# Patient Record
Sex: FEMALE | Race: WHITE | NOT HISPANIC OR LATINO | Employment: UNEMPLOYED | URBAN - METROPOLITAN AREA
[De-identification: names, ages, dates, MRNs, and addresses within clinical notes are randomized per-mention and may not be internally consistent; named-entity substitution may affect disease eponyms.]

---

## 2018-02-26 ENCOUNTER — HOSPITAL ENCOUNTER (EMERGENCY)
Facility: HOSPITAL | Age: 28
Discharge: HOME | End: 2018-02-26
Attending: EMERGENCY MEDICINE
Payer: MEDICAID

## 2018-02-26 VITALS
RESPIRATION RATE: 16 BRPM | OXYGEN SATURATION: 100 % | HEART RATE: 89 BPM | WEIGHT: 140 LBS | SYSTOLIC BLOOD PRESSURE: 108 MMHG | BODY MASS INDEX: 21.29 KG/M2 | TEMPERATURE: 98 F | DIASTOLIC BLOOD PRESSURE: 58 MMHG

## 2018-02-26 DIAGNOSIS — N92.1 METRORRHAGIA: ICD-10-CM

## 2018-02-26 DIAGNOSIS — N39.0 URINARY TRACT INFECTION WITHOUT HEMATURIA, SITE UNSPECIFIED: Primary | ICD-10-CM

## 2018-02-26 LAB
B-HCG UR QL: NEGATIVE
BACTERIA #/AREA URNS HPF: ABNORMAL /HPF
BACTERIA GENITAL QL WET PREP: ABNORMAL
BILIRUB UR QL STRIP: NEGATIVE
C TRACH DNA SPEC QL NAA+PROBE: NOT DETECTED
CLARITY UR: ABNORMAL
CLUE CELLS VAG QL WET PREP: ABNORMAL
COLOR UR: YELLOW
CTP QC/QA: YES
FILAMENT FUNGI VAG WET PREP-#/AREA: ABNORMAL
GLUCOSE UR QL STRIP: NEGATIVE
HGB UR QL STRIP: ABNORMAL
KETONES UR QL STRIP: NEGATIVE
LEUKOCYTE ESTERASE UR QL STRIP: ABNORMAL
MICROSCOPIC COMMENT: ABNORMAL
N GONORRHOEA DNA SPEC QL NAA+PROBE: NOT DETECTED
NITRITE UR QL STRIP: NEGATIVE
PH UR STRIP: 6 [PH] (ref 5–8)
PROT UR QL STRIP: NEGATIVE
RBC #/AREA URNS HPF: 0 /HPF (ref 0–4)
SP GR UR STRIP: >=1.03 (ref 1–1.03)
SPECIMEN SOURCE: ABNORMAL
SQUAMOUS #/AREA URNS HPF: 7 /HPF
T VAGINALIS GENITAL QL WET PREP: ABNORMAL
URN SPEC COLLECT METH UR: ABNORMAL
UROBILINOGEN UR STRIP-ACNC: NEGATIVE EU/DL
WBC #/AREA URNS HPF: 20 /HPF (ref 0–5)
WBC #/AREA VAG WET PREP: ABNORMAL
YEAST GENITAL QL WET PREP: ABNORMAL

## 2018-02-26 PROCEDURE — 81025 URINE PREGNANCY TEST: CPT | Performed by: EMERGENCY MEDICINE

## 2018-02-26 PROCEDURE — 87210 SMEAR WET MOUNT SALINE/INK: CPT

## 2018-02-26 PROCEDURE — 87086 URINE CULTURE/COLONY COUNT: CPT

## 2018-02-26 PROCEDURE — 87491 CHLMYD TRACH DNA AMP PROBE: CPT

## 2018-02-26 PROCEDURE — 99284 EMERGENCY DEPT VISIT MOD MDM: CPT

## 2018-02-26 PROCEDURE — 81000 URINALYSIS NONAUTO W/SCOPE: CPT

## 2018-02-26 RX ORDER — NITROFURANTOIN 25; 75 MG/1; MG/1
100 CAPSULE ORAL 2 TIMES DAILY
Qty: 10 CAPSULE | Refills: 0 | Status: SHIPPED | OUTPATIENT
Start: 2018-02-26 | End: 2018-03-03

## 2018-02-26 NOTE — ED PROVIDER NOTES
Encounter Date: 2018       History     Chief Complaint   Patient presents with    Abdominal Pain     x 2 weeks     Donis Angel is a 27 year old female with no pmh presenting to the ED with a 2 week history of intermittent lower abdominal pain, nausea. Her LMP was 1-19-18 and she has had spotting since then. She states that she took a pregnancy test at home that was negative and wanted confirmation today. She has had urinary frequency but denies dysuria, hematuria, flank pain, fever. He also reports yellow vaginal discharge.           Review of patient's allergies indicates:   Allergen Reactions    Penicillins Anaphylaxis     History reviewed. No pertinent past medical history.  Past Surgical History:   Procedure Laterality Date    Caes       SECTION, CLASSIC      TONSILLECTOMY       History reviewed. No pertinent family history.  Social History   Substance Use Topics    Smoking status: Never Smoker    Smokeless tobacco: Not on file    Alcohol use No     Review of Systems   Constitutional: Negative for chills and fever.   HENT: Negative for congestion, rhinorrhea and sore throat.    Eyes: Negative for pain and redness.   Respiratory: Negative for cough and shortness of breath.    Cardiovascular: Negative for chest pain and palpitations.   Gastrointestinal: Positive for abdominal pain and nausea. Negative for diarrhea.   Genitourinary: Positive for frequency, vaginal bleeding and vaginal discharge. Negative for dysuria, flank pain, hematuria and urgency.   Musculoskeletal: Negative for gait problem, myalgias and neck pain.   Skin: Negative for rash.   Neurological: Negative for dizziness, light-headedness and headaches.       Physical Exam     Initial Vitals [18 0756]   BP Pulse Resp Temp SpO2   (!) 108/58 89 16 97.8 °F (36.6 °C) 100 %      MAP       74.67         Physical Exam    Constitutional: Vital signs are normal. She appears well-developed and well-nourished. She is not diaphoretic.  She is active. She does not have a sickly appearance. No distress.   HENT:   Head: Normocephalic and atraumatic.   Eyes: Conjunctivae are normal.   Neck: Normal range of motion and full passive range of motion without pain.   Cardiovascular: Normal rate, regular rhythm and normal heart sounds.   Pulmonary/Chest: Breath sounds normal.   Abdominal: Soft. Bowel sounds are normal. There is tenderness (mild suprapubic).   Genitourinary: Cervix exhibits no motion tenderness, no discharge and no friability. Right adnexum displays no tenderness. Left adnexum displays no tenderness. There is bleeding (small amount of dark red in vaginal vault) in the vagina. No vaginal discharge found.   Musculoskeletal: Normal range of motion.   Neurological: She is alert. Gait normal.   Skin: Skin is warm and dry. Capillary refill takes less than 2 seconds.   Psychiatric: She has a normal mood and affect.         ED Course   Procedures  Labs Reviewed   URINALYSIS   URINALYSIS MICROSCOPIC   POCT URINE PREGNANCY                   APC / Resident Notes:   Donis Angel is a 27 year old female presenting to the ED with suprapubic tenderness (mild), urinary frequency, and vaginal bleeding. Patient had no lower adnexal tenderness with only mild suprapubic tenderness. Minimal amount of vaginal bleeding noted on pelvic exam with no CMT. Patient has 20 WBC in the urine with no evidence of pyelonephritis. Patient will be discharged on macrobid for UTI and urine will be sent for culture. I do not suspect PID, ovarian torsion, TOA as she has no adnexal tenderness. I recommended close follow up with her GYN, Dr. Griffiths. I discussed specific return precautions and she verbalized understanding. Based on my clinical evaluation, I do not appreciate any immediate, emergent, or life threatening condition or etiology that warrants additional workup today and feel that the patient can be discharged with close follow up care.            Attending Attestation:              Attending ED Notes:    I discussed the patient's care with Advanced Practice Clinician.  I reviewed their note and agree with the history, physical, assessment, diagnosis, treatment, and discharge plan provided by the Advanced Practice Clinician.  We'll treat patient for urinary tract infection with no signs of pyelonephritis.  No fevers.  Follow up with GYN.             Clinical Impression:   The primary encounter diagnosis was Urinary tract infection without hematuria, site unspecified. A diagnosis of Metrorrhagia was also pertinent to this visit.    Disposition:   Disposition: Discharged  Condition: Stable                        Shirlene Vera NP  02/26/18 6523

## 2018-02-27 LAB — BACTERIA UR CULT: NORMAL

## 2018-10-18 ENCOUNTER — HOSPITAL ENCOUNTER (EMERGENCY)
Facility: HOSPITAL | Age: 28
Discharge: LEFT BEFORE DISCHARGE | End: 2018-10-18
Attending: EMERGENCY MEDICINE
Payer: MEDICAID

## 2018-10-18 VITALS
SYSTOLIC BLOOD PRESSURE: 125 MMHG | TEMPERATURE: 98 F | HEIGHT: 68 IN | HEART RATE: 95 BPM | DIASTOLIC BLOOD PRESSURE: 70 MMHG | RESPIRATION RATE: 14 BRPM | OXYGEN SATURATION: 100 % | BODY MASS INDEX: 23.95 KG/M2 | WEIGHT: 158 LBS

## 2018-10-18 DIAGNOSIS — N39.0 LOWER URINARY TRACT INFECTIOUS DISEASE: ICD-10-CM

## 2018-10-18 DIAGNOSIS — Z3A.01 LESS THAN 8 WEEKS GESTATION OF PREGNANCY: Primary | ICD-10-CM

## 2018-10-18 LAB
ALBUMIN SERPL BCP-MCNC: 4.2 G/DL
ALP SERPL-CCNC: 63 U/L
ALT SERPL W/O P-5'-P-CCNC: 8 U/L
ANION GAP SERPL CALC-SCNC: 6 MMOL/L
AST SERPL-CCNC: 14 U/L
B-HCG UR QL: POSITIVE
BACTERIA #/AREA URNS HPF: ABNORMAL /HPF
BASOPHILS # BLD AUTO: 0 K/UL
BASOPHILS NFR BLD: 0.7 %
BILIRUB SERPL-MCNC: 0.4 MG/DL
BILIRUB UR QL STRIP: NEGATIVE
BUN SERPL-MCNC: 10 MG/DL
CALCIUM SERPL-MCNC: 10 MG/DL
CHLORIDE SERPL-SCNC: 105 MMOL/L
CLARITY UR: CLEAR
CO2 SERPL-SCNC: 26 MMOL/L
COLOR UR: YELLOW
CREAT SERPL-MCNC: 0.7 MG/DL
CTP QC/QA: YES
DIFFERENTIAL METHOD: ABNORMAL
EOSINOPHIL # BLD AUTO: 0 K/UL
EOSINOPHIL NFR BLD: 0.5 %
ERYTHROCYTE [DISTWIDTH] IN BLOOD BY AUTOMATED COUNT: 15.2 %
EST. GFR  (AFRICAN AMERICAN): >60 ML/MIN/1.73 M^2
EST. GFR  (NON AFRICAN AMERICAN): >60 ML/MIN/1.73 M^2
GLUCOSE SERPL-MCNC: 89 MG/DL
GLUCOSE UR QL STRIP: NEGATIVE
HCT VFR BLD AUTO: 35.1 %
HGB BLD-MCNC: 11.6 G/DL
HGB UR QL STRIP: ABNORMAL
KETONES UR QL STRIP: ABNORMAL
LEUKOCYTE ESTERASE UR QL STRIP: ABNORMAL
LIPASE SERPL-CCNC: 27 U/L
LYMPHOCYTES # BLD AUTO: 1.5 K/UL
LYMPHOCYTES NFR BLD: 23.1 %
MCH RBC QN AUTO: 26.5 PG
MCHC RBC AUTO-ENTMCNC: 32.9 G/DL
MCV RBC AUTO: 80 FL
MICROSCOPIC COMMENT: ABNORMAL
MONOCYTES # BLD AUTO: 0.5 K/UL
MONOCYTES NFR BLD: 7.9 %
NEUTROPHILS # BLD AUTO: 4.3 K/UL
NEUTROPHILS NFR BLD: 67.8 %
NITRITE UR QL STRIP: NEGATIVE
PH UR STRIP: 6 [PH] (ref 5–8)
PLATELET # BLD AUTO: 232 K/UL
PMV BLD AUTO: 8.2 FL
POTASSIUM SERPL-SCNC: 4.1 MMOL/L
PROT SERPL-MCNC: 7.6 G/DL
PROT UR QL STRIP: NEGATIVE
RBC # BLD AUTO: 4.37 M/UL
RBC #/AREA URNS HPF: 6 /HPF (ref 0–4)
SODIUM SERPL-SCNC: 137 MMOL/L
SP GR UR STRIP: >=1.03 (ref 1–1.03)
SQUAMOUS #/AREA URNS HPF: 16 /HPF
URN SPEC COLLECT METH UR: ABNORMAL
UROBILINOGEN UR STRIP-ACNC: NEGATIVE EU/DL
WBC # BLD AUTO: 6.4 K/UL
WBC #/AREA URNS HPF: 14 /HPF (ref 0–5)

## 2018-10-18 PROCEDURE — 81025 URINE PREGNANCY TEST: CPT | Performed by: EMERGENCY MEDICINE

## 2018-10-18 PROCEDURE — 83690 ASSAY OF LIPASE: CPT

## 2018-10-18 PROCEDURE — 36415 COLL VENOUS BLD VENIPUNCTURE: CPT

## 2018-10-18 PROCEDURE — 81000 URINALYSIS NONAUTO W/SCOPE: CPT

## 2018-10-18 PROCEDURE — 80053 COMPREHEN METABOLIC PANEL: CPT

## 2018-10-18 PROCEDURE — 99284 EMERGENCY DEPT VISIT MOD MDM: CPT

## 2018-10-18 PROCEDURE — 85025 COMPLETE CBC W/AUTO DIFF WBC: CPT

## 2018-10-18 RX ORDER — NITROFURANTOIN 25; 75 MG/1; MG/1
100 CAPSULE ORAL 2 TIMES DAILY
Qty: 14 CAPSULE | Refills: 0 | Status: SHIPPED | OUTPATIENT
Start: 2018-10-18 | End: 2023-11-15

## 2018-10-18 NOTE — ED NOTES
Pt in room 7 for evaluation of abd pain.  Pt is awake, alert and oriented. Resp even and unlabored.  Pt denies chest pain or sob. Pt denies nausea, vomiting or diarrhea.  Pt reports few days late on period and possibly pregnant.  Skin warm and dry. Pt denies vaginal bleeding or discharge.

## 2018-10-18 NOTE — ED PROVIDER NOTES
"Encounter Date: 10/18/2018    SCRIBE #1 NOTE: I, Vanita Luke, am scribing for, and in the presence of, Dr. Centeno.       History     Chief Complaint   Patient presents with    Abdominal Pain     lower abd cramping that started last week and has gotten worse. Denies any n/v/d at this time.       Time seen by provider: 1:31 PM on 10/18/2018    Donis Angel is a 28 y.o. female who presents to the ED complaining of lower abdominal pain x 2 weeks. She describes the pain as "all day, every day." She states that she is 2 days late on her period and does not take birth control. The patient denies nausea, vomiting, or diarrhea, or any other symptoms at this time. No pertinent PMHx or SHx noted. Allergies include Pcn.      The history is provided by the patient.     Review of patient's allergies indicates:   Allergen Reactions    Penicillins Anaphylaxis     No past medical history on file.  Past Surgical History:   Procedure Laterality Date    Caes       SECTION, CLASSIC      TONSILLECTOMY       No family history on file.  Social History     Tobacco Use    Smoking status: Never Smoker   Substance Use Topics    Alcohol use: No    Drug use: No     Review of Systems   Constitutional: Negative for fever.   HENT: Negative for sore throat.    Respiratory: Negative for shortness of breath.    Cardiovascular: Negative for chest pain.   Gastrointestinal: Positive for abdominal pain. Negative for diarrhea, nausea and vomiting.   Genitourinary: Negative for dysuria.   Musculoskeletal: Negative for back pain.   Skin: Negative for rash.   Neurological: Negative for weakness.   Hematological: Does not bruise/bleed easily.       Physical Exam     Initial Vitals [10/18/18 1309]   BP Pulse Resp Temp SpO2   125/70 95 14 97.9 °F (36.6 °C) 100 %      MAP       --         Physical Exam    Nursing note and vitals reviewed.  Constitutional: She appears well-developed and well-nourished.  Non-toxic appearance. No distress. "   HENT:   Head: Normocephalic and atraumatic.   Eyes: EOM are normal. Pupils are equal, round, and reactive to light.   Neck: Normal range of motion. Neck supple. No neck rigidity. No JVD present.   Cardiovascular: Normal rate, regular rhythm, normal heart sounds and intact distal pulses. Exam reveals no gallop and no friction rub.    No murmur heard.  Pulmonary/Chest: Breath sounds normal. She has no wheezes. She has no rhonchi. She has no rales.   Abdominal: Soft. Bowel sounds are normal. She exhibits no distension. There is no tenderness. There is no rigidity, no rebound and no guarding.   Musculoskeletal: Normal range of motion.   Neurological: She is alert and oriented to person, place, and time. She has normal strength and normal reflexes. No cranial nerve deficit or sensory deficit. She exhibits normal muscle tone. Coordination normal. GCS eye subscore is 4. GCS verbal subscore is 5. GCS motor subscore is 6.   Skin: Skin is warm and dry.   Psychiatric: She has a normal mood and affect. Her speech is normal and behavior is normal. She is not actively hallucinating.         ED Course   Procedures  Labs Reviewed   URINALYSIS - Abnormal; Notable for the following components:       Result Value    Specific Gravity, UA >=1.030 (*)     Ketones, UA Trace (*)     Occult Blood UA 1+ (*)     Leukocytes, UA 2+ (*)     All other components within normal limits   CBC W/ AUTO DIFFERENTIAL - Abnormal; Notable for the following components:    Hemoglobin 11.6 (*)     Hematocrit 35.1 (*)     MCV 80 (*)     MCH 26.5 (*)     RDW 15.2 (*)     MPV 8.2 (*)     All other components within normal limits   COMPREHENSIVE METABOLIC PANEL - Abnormal; Notable for the following components:    ALT 8 (*)     Anion Gap 6 (*)     All other components within normal limits   URINALYSIS MICROSCOPIC - Abnormal; Notable for the following components:    RBC, UA 6 (*)     WBC, UA 14 (*)     Bacteria, UA Moderate (*)     All other components within  normal limits   POCT URINE PREGNANCY - Abnormal; Notable for the following components:    POC Preg Test, Ur Positive (*)     All other components within normal limits   LIPASE          Imaging Results    None          Medical Decision Making:   History:   Old Medical Records: I decided to obtain old medical records.  Initial Assessment:   Patient is a 28-year-old woman who presents emergency department for evaluation of abdominal pain.  She has a negative and completely benign abdominal examination on exam and.  Patient states she had questionable positive pregnancy test at home and would like a pregnancy test.  Pregnancy test was performed and was positive. Patient was informed of this.  Patient eloped from the emergency department while awaiting laboratory evaluation.  Urinalysis returned concerning for possible urinary tractInfection.  I have low suspicion for ruptured ectopic pregnancy, pyelonephritis or sepsis.  Called patient and left a voice message on machine informing of UTI and need to  a prescription that I wrote for her for Macrobid.  Clinical Tests:   Lab Tests: Ordered and Reviewed            Scribe Attestation:   Scribe #1: I performed the above scribed service and the documentation accurately describes the services I performed. I attest to the accuracy of the note.        I, Jacobo Hollingsworth, personally performed the services described in this documentation. All medical record entries made by the scribe were at my direction and in my presence.  I have reviewed the chart and agree that the record reflects my personal performance and is accurate and complete. Nish Centeno MD.  9:15 PM 10/18/2018          Clinical Impression:   The primary encounter diagnosis was Less than 8 weeks gestation of pregnancy. A diagnosis of Lower urinary tract infectious disease was also pertinent to this visit.      Disposition:   Disposition: Discharged  Condition: Stable                        Nish DING  MD Jacobo  10/18/18 [id number]

## 2020-10-13 NOTE — PROGRESS NOTES
"1520 Spoke with Dr. Griffiths re. Pt states she had an anaphylactic rxn to pcn. Dr. Griffiths states this is not what the pt stated to her. Dr. Griffiths states to hold the Rocephin injection until she decides how to treat and she will speak with pt tomorrow concerning tx.  1527 Spoke with pt and tried to get more clarification from pt re. Rxn to pcn. Pt states had increased heart rate, nausea and vomiting and "felt like I couldn't breath". Pt states she can't remember what she was given for the allergic rxn, if anything. Could not get real clarification from pt. Instructed her to call Dr. Griffiths office tomorrow for further instructions.    "

## 2020-10-15 ENCOUNTER — INFUSION (OUTPATIENT)
Dept: INFUSION THERAPY | Facility: HOSPITAL | Age: 30
End: 2020-10-15
Attending: SPECIALIST
Payer: MEDICAID

## 2020-10-15 VITALS
HEART RATE: 88 BPM | DIASTOLIC BLOOD PRESSURE: 84 MMHG | TEMPERATURE: 98 F | RESPIRATION RATE: 20 BRPM | SYSTOLIC BLOOD PRESSURE: 119 MMHG

## 2020-10-15 DIAGNOSIS — A54.9 GONORRHEA: Primary | ICD-10-CM

## 2020-10-15 PROCEDURE — 96372 THER/PROPH/DIAG INJ SC/IM: CPT

## 2020-10-15 PROCEDURE — 63600175 PHARM REV CODE 636 W HCPCS: Performed by: SPECIALIST

## 2020-10-15 RX ORDER — GENTAMICIN SULFATE 40 MG/ML
240 INJECTION, SOLUTION INTRAMUSCULAR; INTRAVENOUS ONCE
Status: COMPLETED | OUTPATIENT
Start: 2020-10-15 | End: 2020-10-15

## 2020-10-15 RX ADMIN — GENTAMICIN SULFATE 240 MG: 40 INJECTION, SOLUTION INTRAMUSCULAR; INTRAVENOUS at 12:10

## 2020-10-15 NOTE — PROGRESS NOTES
1245 shot time completed, pt tolerated well, denies any symptoms of reaction, discharged to home

## 2021-07-07 ENCOUNTER — HOSPITAL ENCOUNTER (EMERGENCY)
Facility: HOSPITAL | Age: 31
Discharge: HOME | End: 2021-07-07
Attending: EMERGENCY MEDICINE
Payer: MEDICAID

## 2021-07-07 VITALS
OXYGEN SATURATION: 96 % | BODY MASS INDEX: 28.04 KG/M2 | HEIGHT: 68 IN | DIASTOLIC BLOOD PRESSURE: 90 MMHG | WEIGHT: 185 LBS | TEMPERATURE: 99 F | RESPIRATION RATE: 18 BRPM | SYSTOLIC BLOOD PRESSURE: 138 MMHG | HEART RATE: 95 BPM

## 2021-07-07 DIAGNOSIS — K08.89 TOOTHACHE: Primary | ICD-10-CM

## 2021-07-07 PROCEDURE — 99282 EMERGENCY DEPT VISIT SF MDM: CPT

## 2021-07-07 RX ORDER — DOXYCYCLINE 100 MG/1
100 CAPSULE ORAL 2 TIMES DAILY
Qty: 20 CAPSULE | Refills: 0 | Status: SHIPPED | OUTPATIENT
Start: 2021-07-07 | End: 2021-07-17

## 2021-07-07 RX ORDER — HYDROCODONE BITARTRATE AND ACETAMINOPHEN 5; 325 MG/1; MG/1
1 TABLET ORAL EVERY 4 HOURS PRN
Qty: 12 TABLET | Refills: 0 | Status: SHIPPED | OUTPATIENT
Start: 2021-07-07

## 2022-01-28 DIAGNOSIS — N64.4 MASTODYNIA: Primary | ICD-10-CM

## 2022-05-06 ENCOUNTER — HOSPITAL ENCOUNTER (EMERGENCY)
Facility: HOSPITAL | Age: 32
Discharge: HOME | End: 2022-05-07
Attending: EMERGENCY MEDICINE
Payer: MEDICAID

## 2022-05-06 DIAGNOSIS — R51.9 NONINTRACTABLE HEADACHE, UNSPECIFIED CHRONICITY PATTERN, UNSPECIFIED HEADACHE TYPE: ICD-10-CM
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
## 2022-05-06 DIAGNOSIS — M54.2 NECK PAIN: Primary | ICD-10-CM

## 2022-05-06 PROCEDURE — 99285 EMERGENCY DEPT VISIT HI MDM: CPT

## 2022-05-06 NOTE — Clinical Note
"Donis Xiong" Stanley was seen and treated in our emergency department on 5/6/2022.  She may return to work on 05/10/2022.       If you have any questions or concerns, please don't hesitate to call.      Dinesh Fitzpatrick RN    "

## 2022-05-06 NOTE — Clinical Note
"Donis"Montserrat Angel was seen and treated in our emergency department on 5/6/2022.  She may return to work on 05/09/2022.       If you have any questions or concerns, please don't hesitate to call.      Eder Altamirano MD"

## 2022-05-07 VITALS
DIASTOLIC BLOOD PRESSURE: 63 MMHG | HEART RATE: 85 BPM | SYSTOLIC BLOOD PRESSURE: 104 MMHG | RESPIRATION RATE: 17 BRPM | BODY MASS INDEX: 25.09 KG/M2 | OXYGEN SATURATION: 97 % | TEMPERATURE: 99 F | WEIGHT: 165 LBS

## 2022-05-07 LAB
B-HCG UR QL: NEGATIVE
CTP QC/QA: YES

## 2022-05-07 PROCEDURE — 81025 URINE PREGNANCY TEST: CPT | Performed by: STUDENT IN AN ORGANIZED HEALTH CARE EDUCATION/TRAINING PROGRAM

## 2022-05-07 PROCEDURE — 25000003 PHARM REV CODE 250: Performed by: STUDENT IN AN ORGANIZED HEALTH CARE EDUCATION/TRAINING PROGRAM

## 2022-05-07 RX ORDER — NAPROXEN 500 MG/1
500 TABLET ORAL 2 TIMES DAILY WITH MEALS
Qty: 10 TABLET | Refills: 0 | Status: SHIPPED | OUTPATIENT
Start: 2022-05-07 | End: 2022-05-12

## 2022-05-07 RX ORDER — METHOCARBAMOL 500 MG/1
500 TABLET, FILM COATED ORAL 3 TIMES DAILY
Qty: 15 TABLET | Refills: 0 | Status: SHIPPED | OUTPATIENT
Start: 2022-05-07 | End: 2022-05-12

## 2022-05-07 RX ORDER — ACETAMINOPHEN 500 MG
1000 TABLET ORAL
Status: COMPLETED | OUTPATIENT
Start: 2022-05-07 | End: 2022-05-07

## 2022-05-07 RX ADMIN — ACETAMINOPHEN 1000 MG: 500 TABLET, FILM COATED ORAL at 02:05
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
## 2022-05-07 NOTE — DISCHARGE INSTRUCTIONS
Take medications as prescribed will give Robaxin and naproxen for pain.  Return if worsening symptoms.

## 2022-05-07 NOTE — ED NOTES
Adult Physical Assessment  LOC: Donis Angel, 31 y.o. female verified via two identifiers.  The patient is awake, alert, oriented and speaking appropriately at this time.  APPEARANCE: Patient resting comfortably and appears to be in no acute distress at this time. Patient is clean and well groomed, patient's clothing is properly fastened.  SKIN:The skin is warm and dry, color consistent with ethnicity, patient has normal skin turgor and moist mucus membranes, skin intact, no breakdown or brusing noted.  MUSCULOSKELETAL: Patient moving all extremities well, no obvious swelling or deformities noted.  Pt complains of neck pain, right lower side pain, and top and front of head hurting. 10/10 pain    RESPIRATORY: Airway is open and patent, respirations are spontaneous, patient has a normal effort and rate, no accessory muscle use noted.  CARDIAC: Patient has a normal rate and rhythm, no periphreal edema noted in any extremity, capillary refill < 3 seconds in all extremities  ABDOMEN: Soft and non tender to palpation, no abdominal distention noted. Bowel sounds present in all four quadrants.  NEUROLOGIC: Eyes open spontaneously, behavior appropriate to situation, follows commands, facial expression symmetrical, bilateral hand grasp equal and even, purposeful motor response noted, normal sensation in all extremities when touched with a finger.

## 2022-05-07 NOTE — ED NOTES
Resting comfortably on stretcher with rr e/u. Vss. Pillow provided for additional comfort. No other complaints/requests made. Pt in nad with significant other at bedside. Awaiting ct results.

## 2022-11-30 ENCOUNTER — HOSPITAL ENCOUNTER (OUTPATIENT)
Facility: HOSPITAL | Age: 32
Discharge: HOME | End: 2022-12-01
Attending: EMERGENCY MEDICINE | Admitting: OBSTETRICS & GYNECOLOGY
Payer: MEDICAID

## 2022-11-30 DIAGNOSIS — R10.2 PELVIC PAIN: Primary | ICD-10-CM

## 2022-11-30 DIAGNOSIS — R18.8 FREE FLUID IN PELVIS: ICD-10-CM

## 2022-11-30 PROBLEM — N83.209 RUPTURED CYST OF OVARY: Status: ACTIVE | Noted: 2022-11-30

## 2022-11-30 LAB
ALBUMIN SERPL BCP-MCNC: 4.1 G/DL (ref 3.5–5.2)
ALP SERPL-CCNC: 57 U/L (ref 55–135)
ALT SERPL W/O P-5'-P-CCNC: 14 U/L (ref 10–44)
ANION GAP SERPL CALC-SCNC: 6 MMOL/L (ref 8–16)
AST SERPL-CCNC: 17 U/L (ref 10–40)
B-HCG UR QL: NEGATIVE
B-HCG UR QL: NEGATIVE
BACTERIA GENITAL QL WET PREP: ABNORMAL
BASOPHILS # BLD AUTO: 0.04 K/UL (ref 0–0.2)
BASOPHILS NFR BLD: 0.4 % (ref 0–1.9)
BILIRUB SERPL-MCNC: 1 MG/DL (ref 0.1–1)
BILIRUB UR QL STRIP: NEGATIVE
BUN SERPL-MCNC: 13 MG/DL (ref 6–20)
CALCIUM SERPL-MCNC: 9 MG/DL (ref 8.7–10.5)
CHLORIDE SERPL-SCNC: 104 MMOL/L (ref 95–110)
CK SERPL-CCNC: 54 U/L (ref 20–180)
CLARITY UR: CLEAR
CLUE CELLS VAG QL WET PREP: ABNORMAL
CO2 SERPL-SCNC: 24 MMOL/L (ref 23–29)
COLOR UR: YELLOW
CREAT SERPL-MCNC: 0.7 MG/DL (ref 0.5–1.4)
CTP QC/QA: YES
D DIMER PPP IA.FEU-MCNC: 1.31 UG/ML FEU
DIFFERENTIAL METHOD: ABNORMAL
EOSINOPHIL # BLD AUTO: 0 K/UL (ref 0–0.5)
EOSINOPHIL NFR BLD: 0.1 % (ref 0–8)
EOSINOPHIL NFR BLD: 0.3 % (ref 0–8)
EOSINOPHIL NFR BLD: 0.3 % (ref 0–8)
ERYTHROCYTE [DISTWIDTH] IN BLOOD BY AUTOMATED COUNT: 12.4 % (ref 11.5–14.5)
ERYTHROCYTE [DISTWIDTH] IN BLOOD BY AUTOMATED COUNT: 12.5 % (ref 11.5–14.5)
ERYTHROCYTE [DISTWIDTH] IN BLOOD BY AUTOMATED COUNT: 12.5 % (ref 11.5–14.5)
EST. GFR  (NO RACE VARIABLE): >60 ML/MIN/1.73 M^2
FILAMENT FUNGI VAG WET PREP-#/AREA: ABNORMAL
GLUCOSE SERPL-MCNC: 93 MG/DL (ref 70–110)
GLUCOSE UR QL STRIP: NEGATIVE
GROUP A STREP, MOLECULAR: NEGATIVE
HCT VFR BLD AUTO: 28.9 % (ref 37–48.5)
HCT VFR BLD AUTO: 31.8 % (ref 37–48.5)
HCT VFR BLD AUTO: 35.4 % (ref 37–48.5)
HGB BLD-MCNC: 10 G/DL (ref 12–16)
HGB BLD-MCNC: 10.9 G/DL (ref 12–16)
HGB BLD-MCNC: 12.3 G/DL (ref 12–16)
HGB UR QL STRIP: ABNORMAL
IMM GRANULOCYTES # BLD AUTO: 0.03 K/UL (ref 0–0.04)
IMM GRANULOCYTES # BLD AUTO: 0.04 K/UL (ref 0–0.04)
IMM GRANULOCYTES # BLD AUTO: 0.06 K/UL (ref 0–0.04)
IMM GRANULOCYTES NFR BLD AUTO: 0.3 % (ref 0–0.5)
IMM GRANULOCYTES NFR BLD AUTO: 0.4 % (ref 0–0.5)
IMM GRANULOCYTES NFR BLD AUTO: 0.5 % (ref 0–0.5)
INFLUENZA A, MOLECULAR: NEGATIVE
INFLUENZA B, MOLECULAR: NEGATIVE
KETONES UR QL STRIP: NEGATIVE
LEUKOCYTE ESTERASE UR QL STRIP: NEGATIVE
LIPASE SERPL-CCNC: 28 U/L (ref 4–60)
LYMPHOCYTES # BLD AUTO: 1.7 K/UL (ref 1–4.8)
LYMPHOCYTES # BLD AUTO: 1.8 K/UL (ref 1–4.8)
LYMPHOCYTES # BLD AUTO: 2.4 K/UL (ref 1–4.8)
LYMPHOCYTES NFR BLD: 15.3 % (ref 18–48)
LYMPHOCYTES NFR BLD: 17.5 % (ref 18–48)
LYMPHOCYTES NFR BLD: 21.9 % (ref 18–48)
MAGNESIUM SERPL-MCNC: 2.1 MG/DL (ref 1.6–2.6)
MCH RBC QN AUTO: 30.8 PG (ref 27–31)
MCH RBC QN AUTO: 31.1 PG (ref 27–31)
MCH RBC QN AUTO: 31.4 PG (ref 27–31)
MCHC RBC AUTO-ENTMCNC: 34.3 G/DL (ref 32–36)
MCHC RBC AUTO-ENTMCNC: 34.6 G/DL (ref 32–36)
MCHC RBC AUTO-ENTMCNC: 34.7 G/DL (ref 32–36)
MCV RBC AUTO: 89 FL (ref 82–98)
MCV RBC AUTO: 90 FL (ref 82–98)
MCV RBC AUTO: 91 FL (ref 82–98)
MONOCYTES # BLD AUTO: 0.7 K/UL (ref 0.3–1)
MONOCYTES # BLD AUTO: 0.7 K/UL (ref 0.3–1)
MONOCYTES # BLD AUTO: 0.8 K/UL (ref 0.3–1)
MONOCYTES NFR BLD: 6.1 % (ref 4–15)
MONOCYTES NFR BLD: 6.6 % (ref 4–15)
MONOCYTES NFR BLD: 7.6 % (ref 4–15)
NEUTROPHILS # BLD AUTO: 7.5 K/UL (ref 1.8–7.7)
NEUTROPHILS # BLD AUTO: 7.7 K/UL (ref 1.8–7.7)
NEUTROPHILS # BLD AUTO: 8.5 K/UL (ref 1.8–7.7)
NEUTROPHILS NFR BLD: 70.4 % (ref 38–73)
NEUTROPHILS NFR BLD: 73.9 % (ref 38–73)
NEUTROPHILS NFR BLD: 77.6 % (ref 38–73)
NITRITE UR QL STRIP: NEGATIVE
NRBC BLD-RTO: 0 /100 WBC
PH UR STRIP: 7 [PH] (ref 5–8)
PLATELET # BLD AUTO: 160 K/UL (ref 150–450)
PLATELET # BLD AUTO: 193 K/UL (ref 150–450)
PLATELET # BLD AUTO: 199 K/UL (ref 150–450)
PMV BLD AUTO: 10.8 FL (ref 9.2–12.9)
PMV BLD AUTO: 10.9 FL (ref 9.2–12.9)
PMV BLD AUTO: 11.1 FL (ref 9.2–12.9)
POTASSIUM SERPL-SCNC: 3.8 MMOL/L (ref 3.5–5.1)
PROT SERPL-MCNC: 7.3 G/DL (ref 6–8.4)
PROT UR QL STRIP: NEGATIVE
RBC # BLD AUTO: 3.18 M/UL (ref 4–5.4)
RBC # BLD AUTO: 3.54 M/UL (ref 4–5.4)
RBC # BLD AUTO: 3.96 M/UL (ref 4–5.4)
SARS-COV-2 RDRP RESP QL NAA+PROBE: NEGATIVE
SODIUM SERPL-SCNC: 134 MMOL/L (ref 136–145)
SP GR UR STRIP: <=1.005 (ref 1–1.03)
SPECIMEN SOURCE: ABNORMAL
SPECIMEN SOURCE: NORMAL
T VAGINALIS GENITAL QL WET PREP: ABNORMAL
URN SPEC COLLECT METH UR: ABNORMAL
UROBILINOGEN UR STRIP-ACNC: NEGATIVE EU/DL
WBC # BLD AUTO: 10.19 K/UL (ref 3.9–12.7)
WBC # BLD AUTO: 10.9 K/UL (ref 3.9–12.7)
WBC # BLD AUTO: 10.96 K/UL (ref 3.9–12.7)
WBC #/AREA VAG WET PREP: ABNORMAL
YEAST GENITAL QL WET PREP: ABNORMAL

## 2022-11-30 PROCEDURE — 85379 FIBRIN DEGRADATION QUANT: CPT | Performed by: EMERGENCY MEDICINE

## 2022-11-30 PROCEDURE — G0378 HOSPITAL OBSERVATION PER HR: HCPCS

## 2022-11-30 PROCEDURE — 85025 COMPLETE CBC W/AUTO DIFF WBC: CPT | Performed by: EMERGENCY MEDICINE

## 2022-11-30 PROCEDURE — U0002 COVID-19 LAB TEST NON-CDC: HCPCS | Performed by: EMERGENCY MEDICINE

## 2022-11-30 PROCEDURE — 36415 COLL VENOUS BLD VENIPUNCTURE: CPT | Performed by: EMERGENCY MEDICINE

## 2022-11-30 PROCEDURE — 81025 URINE PREGNANCY TEST: CPT | Mod: 91 | Performed by: EMERGENCY MEDICINE

## 2022-11-30 PROCEDURE — 96361 HYDRATE IV INFUSION ADD-ON: CPT

## 2022-11-30 PROCEDURE — 85025 COMPLETE CBC W/AUTO DIFF WBC: CPT | Mod: 91 | Performed by: EMERGENCY MEDICINE

## 2022-11-30 PROCEDURE — 87491 CHLMYD TRACH DNA AMP PROBE: CPT | Performed by: EMERGENCY MEDICINE

## 2022-11-30 PROCEDURE — 83735 ASSAY OF MAGNESIUM: CPT | Performed by: EMERGENCY MEDICINE

## 2022-11-30 PROCEDURE — 25500020 PHARM REV CODE 255: Performed by: EMERGENCY MEDICINE

## 2022-11-30 PROCEDURE — 99285 EMERGENCY DEPT VISIT HI MDM: CPT | Mod: 25

## 2022-11-30 PROCEDURE — 87081 CULTURE SCREEN ONLY: CPT | Performed by: EMERGENCY MEDICINE

## 2022-11-30 PROCEDURE — 87591 N.GONORRHOEAE DNA AMP PROB: CPT | Performed by: EMERGENCY MEDICINE

## 2022-11-30 PROCEDURE — 83690 ASSAY OF LIPASE: CPT | Performed by: EMERGENCY MEDICINE

## 2022-11-30 PROCEDURE — 80053 COMPREHEN METABOLIC PANEL: CPT | Performed by: EMERGENCY MEDICINE

## 2022-11-30 PROCEDURE — 96375 TX/PRO/DX INJ NEW DRUG ADDON: CPT

## 2022-11-30 PROCEDURE — 87651 STREP A DNA AMP PROBE: CPT | Performed by: EMERGENCY MEDICINE

## 2022-11-30 PROCEDURE — 86850 RBC ANTIBODY SCREEN: CPT | Performed by: EMERGENCY MEDICINE

## 2022-11-30 PROCEDURE — 82550 ASSAY OF CK (CPK): CPT | Performed by: EMERGENCY MEDICINE

## 2022-11-30 PROCEDURE — 81003 URINALYSIS AUTO W/O SCOPE: CPT | Performed by: EMERGENCY MEDICINE

## 2022-11-30 PROCEDURE — 81025 URINE PREGNANCY TEST: CPT | Performed by: EMERGENCY MEDICINE

## 2022-11-30 PROCEDURE — 96365 THER/PROPH/DIAG IV INF INIT: CPT | Mod: 59

## 2022-11-30 PROCEDURE — 87502 INFLUENZA DNA AMP PROBE: CPT | Performed by: EMERGENCY MEDICINE

## 2022-11-30 PROCEDURE — 63600175 PHARM REV CODE 636 W HCPCS: Performed by: EMERGENCY MEDICINE

## 2022-11-30 PROCEDURE — 25000003 PHARM REV CODE 250: Performed by: EMERGENCY MEDICINE

## 2022-11-30 PROCEDURE — 87210 SMEAR WET MOUNT SALINE/INK: CPT | Performed by: EMERGENCY MEDICINE

## 2022-11-30 PROCEDURE — 87205 SMEAR GRAM STAIN: CPT | Performed by: EMERGENCY MEDICINE

## 2022-11-30 RX ORDER — ACETAMINOPHEN 10 MG/ML
1000 INJECTION, SOLUTION INTRAVENOUS ONCE
Status: COMPLETED | OUTPATIENT
Start: 2022-11-30 | End: 2022-11-30

## 2022-11-30 RX ORDER — SODIUM CHLORIDE, SODIUM LACTATE, POTASSIUM CHLORIDE, CALCIUM CHLORIDE 600; 310; 30; 20 MG/100ML; MG/100ML; MG/100ML; MG/100ML
INJECTION, SOLUTION INTRAVENOUS CONTINUOUS
Status: DISCONTINUED | OUTPATIENT
Start: 2022-12-01 | End: 2022-12-01 | Stop reason: HOSPADM

## 2022-11-30 RX ORDER — ONDANSETRON 2 MG/ML
4 INJECTION INTRAMUSCULAR; INTRAVENOUS
Status: COMPLETED | OUTPATIENT
Start: 2022-11-30 | End: 2022-11-30

## 2022-11-30 RX ORDER — MORPHINE SULFATE 2 MG/ML
2 INJECTION, SOLUTION INTRAMUSCULAR; INTRAVENOUS
Status: DISCONTINUED | OUTPATIENT
Start: 2022-11-30 | End: 2022-11-30

## 2022-11-30 RX ORDER — ACETAMINOPHEN 650 MG/1
650 SUPPOSITORY RECTAL EVERY 8 HOURS PRN
Status: DISCONTINUED | OUTPATIENT
Start: 2022-11-30 | End: 2022-12-01

## 2022-11-30 RX ORDER — ONDANSETRON 2 MG/ML
4 INJECTION INTRAMUSCULAR; INTRAVENOUS EVERY 8 HOURS PRN
Status: DISCONTINUED | OUTPATIENT
Start: 2022-11-30 | End: 2022-12-01 | Stop reason: HOSPADM

## 2022-11-30 RX ADMIN — SODIUM CHLORIDE 1000 ML: 9 INJECTION, SOLUTION INTRAVENOUS at 04:11

## 2022-11-30 RX ADMIN — ACETAMINOPHEN 1000 MG: 10 INJECTION, SOLUTION INTRAVENOUS at 09:11

## 2022-11-30 RX ADMIN — SODIUM CHLORIDE 1000 ML: 0.9 INJECTION, SOLUTION INTRAVENOUS at 09:11

## 2022-11-30 RX ADMIN — ONDANSETRON 4 MG: 2 INJECTION INTRAMUSCULAR; INTRAVENOUS at 03:11

## 2022-11-30 RX ADMIN — IOHEXOL 100 ML: 350 INJECTION, SOLUTION INTRAVENOUS at 05:11

## 2022-11-30 NOTE — FIRST PROVIDER EVALUATION
"Medical screening examination initiated.  I have conducted a focused provider triage encounter, findings are as follows:    Brief history of present illness: abdominal pain     Vitals:    11/30/22 1020   BP: 107/71   BP Location: Left arm   Patient Position: Sitting   Pulse: 99   Resp: 16   Temp: 97.9 °F (36.6 °C)   TempSrc: Oral   SpO2: 100%   Weight: 74.8 kg (165 lb)   Height: 5' 8" (1.727 m)       Pertinent physical exam:  Complains of diffuse abdominal tenderness on exam patient has tenderness you entire abdomen lower abdomen worse than upper abdomen reports associated nausea denies any vomiting or diarrhea has had no fevers    Brief workup plan: labs     Preliminary workup initiated; this workup will be continued and followed by the physician or advanced practice provider that is assigned to the patient when roomed.  "

## 2022-11-30 NOTE — ED PROVIDER NOTES
Encounter Date: 2022       History     Chief Complaint   Patient presents with    Pelvic Pain     Patient reports bilateral pelvic pain since last night      The history is provided by the patient.   Pelvic Pain  This is a new problem. The current episode started 3 to 5 hours ago. The problem occurs constantly. The problem has not changed since onset.Associated symptoms include abdominal pain. Pertinent negatives include no chest pain, no headaches and no shortness of breath. Nothing aggravates the symptoms. Nothing relieves the symptoms. She has tried nothing for the symptoms.   32-year-old female presents complaining of abdominal pain.  Abdominal pain is reported as pelvic on triage however is diffuse in nature.  The patient states the pain was noted upon awakening.  She denies fever or chills.  She denies nausea vomiting or diarrhea.  Bowel movements have been normal for her.  She states she has infrequent bowel movements however this has been her pattern for years.  She is passing gas.  Her appetite and activity level have been normal.  She denies any gastrointestinal bleeding.  She denies fever, chills, body aches or malaise.  She denies any abnormal vaginal discharge, she rarely has a.  she has a Mirena.  She denies any other problems or complaints.  Review of patient's allergies indicates:   Allergen Reactions    Penicillins Anaphylaxis     No past medical history on file.  Past Surgical History:   Procedure Laterality Date    Caes       SECTION, CLASSIC      TONSILLECTOMY       No family history on file.  Social History     Tobacco Use    Smoking status: Never   Substance Use Topics    Alcohol use: No    Drug use: No     Review of Systems   Constitutional: Negative.  Negative for activity change, appetite change, chills, fatigue and fever.   HENT: Negative.  Negative for congestion, dental problem, ear pain, rhinorrhea, sinus pressure, sinus pain, sore throat and trouble swallowing.    Eyes:  Negative.  Negative for photophobia, pain, redness and visual disturbance.   Respiratory: Negative.  Negative for cough, chest tightness, shortness of breath and wheezing.    Cardiovascular: Negative.  Negative for chest pain, palpitations and leg swelling.   Gastrointestinal:  Positive for abdominal pain. Negative for abdominal distention, anal bleeding, blood in stool, constipation, diarrhea, nausea and vomiting.   Endocrine: Negative.    Genitourinary:  Positive for pelvic pain. Negative for decreased urine volume, difficulty urinating, dysuria, flank pain, frequency, hematuria, urgency, vaginal bleeding and vaginal discharge.   Musculoskeletal: Negative.  Negative for arthralgias, back pain, gait problem, joint swelling, myalgias, neck pain and neck stiffness.   Skin: Negative.  Negative for color change, pallor and rash.   Neurological: Negative.  Negative for dizziness, tremors, seizures, syncope, facial asymmetry, speech difficulty, weakness, light-headedness, numbness and headaches.   Hematological: Negative.  Does not bruise/bleed easily.   Psychiatric/Behavioral: Negative.  Negative for confusion.    All other systems reviewed and are negative.    Physical Exam     Initial Vitals [11/30/22 1020]   BP Pulse Resp Temp SpO2   107/71 99 16 97.9 °F (36.6 °C) 100 %      MAP       --         Physical Exam    Nursing note and vitals reviewed.  Constitutional: She is not diaphoretic. She is active and cooperative.  Non-toxic appearance. She does not have a sickly appearance. She does not appear ill. No distress.   HENT:   Head: Normocephalic and atraumatic.   Right Ear: Tympanic membrane normal.   Left Ear: Tympanic membrane normal.   Nose: Nose normal.   Mouth/Throat: Uvula is midline, oropharynx is clear and moist and mucous membranes are normal. No oral lesions. No uvula swelling. No oropharyngeal exudate, posterior oropharyngeal edema or posterior oropharyngeal erythema.   Eyes: Conjunctivae, EOM and lids are  normal. Pupils are equal, round, and reactive to light. No scleral icterus.   Neck: Trachea normal and phonation normal. Neck supple. No thyroid mass and no thyromegaly present. No stridor present. No JVD present.   Normal range of motion.   Full passive range of motion without pain.     Cardiovascular:  Normal rate, regular rhythm, normal heart sounds, intact distal pulses and normal pulses.     Exam reveals no gallop, no distant heart sounds and no friction rub.       No murmur heard.  Pulmonary/Chest: Effort normal and breath sounds normal. No accessory muscle usage or stridor. No tachypnea. No respiratory distress. She has no wheezes. She has no rhonchi. She has no rales.   Abdominal: Abdomen is soft. Bowel sounds are normal. She exhibits no distension, no pulsatile midline mass and no mass. There is generalized abdominal tenderness. No hernia.   No right CVA tenderness.  No left CVA tenderness. There is guarding. There is no rigidity and no rebound. negative Rovsing's sign  Genitourinary:    Vagina normal.   Cervix exhibits no motion tenderness, no discharge and no friability. Right adnexum displays no mass, no tenderness and no fullness. Left adnexum displays no mass, no tenderness and no fullness.    No vaginal discharge, erythema, tenderness or bleeding.   No erythema, tenderness or bleeding in the vagina.    No foreign body in the vagina.      No signs of injury in the vagina.     Musculoskeletal:         General: No tenderness or edema. Normal range of motion.      Right hand: Normal. Normal range of motion. Normal strength. Normal sensation. Normal capillary refill. Normal pulse.      Left hand: Normal. Normal range of motion. Normal strength. Normal sensation. Normal capillary refill. Normal pulse.      Cervical back: Normal, full passive range of motion without pain, normal range of motion and neck supple. No edema, erythema, rigidity or bony tenderness. No spinous process tenderness or muscular  tenderness. Normal range of motion.      Thoracic back: Normal. No bony tenderness. Normal range of motion.      Lumbar back: Normal. No bony tenderness. Normal range of motion.      Right foot: Normal. Normal range of motion and normal capillary refill. No tenderness or bony tenderness. Normal pulse.      Left foot: Normal. Normal range of motion and normal capillary refill. No tenderness or bony tenderness. Normal pulse.      Comments: Pulses are 2+ throughout, cap refill is less than 2 sec throughout, extremities are nontender throughout with full range of motion. There is no spinal tenderness to palpation.     Neurological: She is alert and oriented to person, place, and time. She has normal strength. She displays normal reflexes. No cranial nerve deficit or sensory deficit. Gait normal. GCS score is 15. GCS eye subscore is 4. GCS verbal subscore is 5. GCS motor subscore is 6.   No focal deficits.   Skin: Skin is warm, dry and intact. Capillary refill takes less than 2 seconds. No ecchymosis, no petechiae and no rash noted. No erythema. No pallor.   Psychiatric: She has a normal mood and affect. Her speech is normal and behavior is normal. Judgment and thought content normal. Cognition and memory are normal.       ED Course   Procedures  Labs Reviewed   VAGINAL SCREEN - Abnormal; Notable for the following components:       Result Value    WBC - Vaginal Screen Few (*)     Bacteria - Vaginal Screen Moderate (*)     All other components within normal limits   CBC W/ AUTO DIFFERENTIAL - Abnormal; Notable for the following components:    RBC 3.96 (*)     Hematocrit 35.4 (*)     MCH 31.1 (*)     Gran # (ANC) 8.5 (*)     Immature Grans (Abs) 0.06 (*)     Gran % 77.6 (*)     Lymph % 15.3 (*)     All other components within normal limits   COMPREHENSIVE METABOLIC PANEL - Abnormal; Notable for the following components:    Sodium 134 (*)     Anion Gap 6 (*)     All other components within normal limits   URINALYSIS, REFLEX  TO URINE CULTURE - Abnormal; Notable for the following components:    Occult Blood UA Trace (*)     All other components within normal limits    Narrative:     Specimen Source->Urine   D DIMER, QUANTITATIVE - Abnormal; Notable for the following components:    D-Dimer 1.31 (*)     All other components within normal limits    Narrative:     Ddimer  critical result(s) repeated. Called and verbal readback   obtained from Dr. Hodgson/ED by [facility] 11/30/2022 17:33   CBC W/ AUTO DIFFERENTIAL - Abnormal; Notable for the following components:    RBC 3.54 (*)     Hemoglobin 10.9 (*)     Hematocrit 31.8 (*)     Gran % 73.9 (*)     Lymph % 17.5 (*)     All other components within normal limits   CULTURE, GONOCOCCUS   GROUP A STREP, MOLECULAR   C. TRACHOMATIS/N. GONORRHOEAE BY AMP DNA   PREGNANCY TEST, URINE RAPID    Narrative:     Specimen Source->Urine   LIPASE   CK   MAGNESIUM   D DIMER, QUANTITATIVE   CK   MAGNESIUM   INFLUENZA A AND B ANTIGEN    Narrative:     Specimen Source->Nasopharyngeal Swab   SARS-COV-2 RNA AMPLIFICATION, QUAL   POCT URINE PREGNANCY   TYPE & SCREEN          Imaging Results              US Pelvis Comp with Transvag NON-OB (xpd (Final result)  Result time 11/30/22 20:33:38      Final result by Vivian Shelton MD (11/30/22 20:33:38)                   Narrative:    EXAM: US PELVIS COMP WITH TRANSVAG NON-OB (XPD)    INDICATION:   PELVIC PAIN    COMPARISON:  CT abdomen and pelvis of the same date.    TECHNIQUE:  Transabdominal and transvaginal scanning was performed with grayscale and color Doppler imaging.    FINDINGS:    Uterus:  Measures  9.5   x  4.6    x  6.4    cm. Multiple cysts are seen in the myometrium.    Endometrium: IUD in the endometrial canal.    Right ovary measures   3.9   x  2    x  2.8    cm. No right ovarian or adnexal masses are identified. Normal blood flow.    Left ovary was not visualized.    Free fluid:  Mixed echogenicity fluid in the pelvis.    Other findings:  None of  significance.    IMPRESSION:    Mixed echogenicity fluid in the pelvis concerning for blood products.    Electronically signed by:  Vivian Shelton MD  11/30/2022 8:33 PM CST Workstation: [id number]                                     CT Abdomen Pelvis With Contrast (Final result)  Result time 11/30/22 18:24:17      Final result by Walker Landaverde MD (11/30/22 18:24:17)                   Narrative:    CMS MANDATED QUALITY DATA - CT RADIATION - 436    All CT scans at this facility utilize dose modulation, iterative reconstruction, and/or weight based dosing when appropriate to reduce radiation dose to as low as reasonably achievable.        Reason: LLQ abdominal pain; RLQ abdominal pain (Age >= 14y)    TECHNIQUE: CT abdomen and pelvis with 100 mL Omnipaque 350.    COMPARISON: None    FINDINGS:    Liver is normal size. No hepatic lesions identified. The gallbladder and common bile duct are within normal limits. The pancreas, spleen and adrenal glands are unremarkable. The kidneys, ureters and bladder are unremarkable. There is a small focus of nondependent gas in the bladder. The uterus is grossly unremarkable. An IUD is noted. There is moderate free fluid in the pelvis, with mixed attenuation, some of this fluid. More hyperattenuating than the other fluid. The adnexal structures are not identified due to large amount of fluid.    Right [place]'s gland cyst measures 2.5 cm. The large and small bowel are normal caliber. There is no bowel wall thickening or inflammatory changes. The appendix is normal. There is no bowel wall thickening or inflammatory changes. The stomach is unremarkable.    There is mild free fluid in the bilateral pericolic gutters. The abdominal aorta is normal caliber. No intra-abdominal lymphadenopathy. The ventral abdominal wall is unremarkable. No acute osseous abnormality.    IMPRESSION:    1.  Moderate amount of free fluid with mixed attenuation noted in the pelvis, likely reflecting blood  products. There is also small amount of free fluid in the pericolic gutters.  2.  Small focus of nondependent gas the bladder, could reflect prior bladder catheterization versus gas-forming infection. Correlate.    Electronically signed by:  Walker Landaverde DO  11/30/2022 6:24 PM CST Workstation: [id number]                                     CTA Chest Non-Coronary (PE Studies) (Final result)  Result time 11/30/22 18:05:37      Final result by Walker Landaverde MD (11/30/22 18:05:37)                   Narrative:    CMS MANDATED QUALITY DATA - CT RADIATION - 436    All CT scans at this facility utilize dose modulation, iterative reconstruction, and/or weight based dosing when appropriate to reduce radiation dose to as low as reasonably achievable.        REASON: Pulmonary embolism (PE) suspected, high prob    TECHNIQUE: CT angiography of thorax with 100 mL Omnipaque 350.   Maximum intensity projection coronal reformations were created at a separate workstation and stored in the patient's permanent medical record.    COMPARISON: CTA chest October 15, 2015.    FINDINGS:    No pulmonary embolism. Thoracic aorta is normal caliber. Heart size is normal.    The central tracheobronchial tree is patent. There is mild dependent subsegmental atelectasis. The lungs are otherwise clear.    Please refer to same day CT abdomen pelvis for evaluation of the abdominal viscera. There is no acute osseous abnormality.    IMPRESSION:    No acute cardiopulmonary abnormality.    Electronically signed by:  Walker Landaverde DO  11/30/2022 6:05 PM CST Workstation: [id number]                                     Medications   sodium chloride 0.9% bolus 1,000 mL (0 mLs Intravenous Stopped 11/30/22 2106)   iohexoL (OMNIPAQUE 350) injection 100 mL (100 mLs Intravenous Given 11/30/22 1750)   ondansetron injection 4 mg (4 mg Intravenous Given 11/30/22 1534)   sodium chloride 0.9% bolus 1,000 mL (0 mLs Intravenous Stopped 11/30/22 2159)   acetaminophen  1,000 mg/100 mL (10 mg/mL) injection 1,000 mg (1,000 mg Intravenous New Bag 11/30/22 4665)     Medical Decision Making:   Clinical Tests:   Lab Tests: Reviewed  Radiological Study: Reviewed  Medical Tests: Reviewed  ED Management:  UPT negative, abdomen with diffuse tenderness with guarding but no rebound, no distention, CT scan with evidence of free fluid, suspected to be blood by density, source of bleeding not identified by CT scan, suspect pelvic source.  Ultrasound with moderate free fluid, right ovary normal, left ovary not visualized.  Constellation of symptoms is likely secondary to a ruptured hemorrhagic ovarian cyst.  The patient is still does have mild abdominal tenderness however the pain has not worsened.  She has pain with ambulation however she is able to ambulate.  No vaginal bleeding.  She did have a slight drop in blood pressure from 115 to 90  systolic.  She was given a L of fluids.  Repeat CBC was ordered with no significant change in H&H, she has been mildly tachycardic but this is improving with IV fluids.  I have discussed the case with Dr. Dunbar on-call for Gynecology--patient's physician is Dr. Griffiths.  Patient will be admitted for observation, serial H/H and supportive care.                         Clinical Impression:   Final diagnoses:  [R10.2] Pelvic pain (Primary)  [R18.8] Free fluid in pelvis with suspected ruptured ovarian cyst      ED Disposition Condition    Observation Stable                Swetha Lopez MD  11/30/22 9878

## 2022-11-30 NOTE — Clinical Note
Diagnosis: Pelvic pain [603686]   Future Attending Provider: TRUONG REDD [[id number]]   Admitting Provider:: PÉREZ GANN [[id number]]

## 2022-12-01 VITALS
TEMPERATURE: 99 F | HEART RATE: 84 BPM | SYSTOLIC BLOOD PRESSURE: 99 MMHG | HEIGHT: 68 IN | RESPIRATION RATE: 16 BRPM | DIASTOLIC BLOOD PRESSURE: 72 MMHG | BODY MASS INDEX: 25.01 KG/M2 | WEIGHT: 165 LBS | OXYGEN SATURATION: 99 %

## 2022-12-01 LAB
ABO + RH BLD: NORMAL
BASOPHILS # BLD AUTO: 0.04 K/UL (ref 0–0.2)
BASOPHILS # BLD AUTO: 0.05 K/UL (ref 0–0.2)
BASOPHILS NFR BLD: 0.5 % (ref 0–1.9)
BASOPHILS NFR BLD: 0.6 % (ref 0–1.9)
BASOPHILS NFR BLD: 0.7 % (ref 0–1.9)
BASOPHILS NFR BLD: 0.7 % (ref 0–1.9)
BLD GP AB SCN CELLS X3 SERPL QL: NORMAL
DIFFERENTIAL METHOD: ABNORMAL
EOSINOPHIL # BLD AUTO: 0 K/UL (ref 0–0.5)
EOSINOPHIL NFR BLD: 0.3 % (ref 0–8)
EOSINOPHIL NFR BLD: 0.3 % (ref 0–8)
EOSINOPHIL NFR BLD: 0.5 % (ref 0–8)
EOSINOPHIL NFR BLD: 0.6 % (ref 0–8)
ERYTHROCYTE [DISTWIDTH] IN BLOOD BY AUTOMATED COUNT: 12.5 % (ref 11.5–14.5)
ERYTHROCYTE [DISTWIDTH] IN BLOOD BY AUTOMATED COUNT: 12.6 % (ref 11.5–14.5)
HCT VFR BLD AUTO: 29.7 % (ref 37–48.5)
HCT VFR BLD AUTO: 30.3 % (ref 37–48.5)
HCT VFR BLD AUTO: 30.9 % (ref 37–48.5)
HCT VFR BLD AUTO: 32.7 % (ref 37–48.5)
HGB BLD-MCNC: 10.2 G/DL (ref 12–16)
HGB BLD-MCNC: 10.5 G/DL (ref 12–16)
HGB BLD-MCNC: 10.5 G/DL (ref 12–16)
HGB BLD-MCNC: 11 G/DL (ref 12–16)
IMM GRANULOCYTES # BLD AUTO: 0.02 K/UL (ref 0–0.04)
IMM GRANULOCYTES # BLD AUTO: 0.03 K/UL (ref 0–0.04)
IMM GRANULOCYTES NFR BLD AUTO: 0.3 % (ref 0–0.5)
IMM GRANULOCYTES NFR BLD AUTO: 0.3 % (ref 0–0.5)
IMM GRANULOCYTES NFR BLD AUTO: 0.4 % (ref 0–0.5)
IMM GRANULOCYTES NFR BLD AUTO: 0.4 % (ref 0–0.5)
LYMPHOCYTES # BLD AUTO: 1.3 K/UL (ref 1–4.8)
LYMPHOCYTES # BLD AUTO: 1.5 K/UL (ref 1–4.8)
LYMPHOCYTES # BLD AUTO: 2.1 K/UL (ref 1–4.8)
LYMPHOCYTES # BLD AUTO: 2.4 K/UL (ref 1–4.8)
LYMPHOCYTES NFR BLD: 19 % (ref 18–48)
LYMPHOCYTES NFR BLD: 25.2 % (ref 18–48)
LYMPHOCYTES NFR BLD: 26.8 % (ref 18–48)
LYMPHOCYTES NFR BLD: 29.4 % (ref 18–48)
MCH RBC QN AUTO: 30.5 PG (ref 27–31)
MCH RBC QN AUTO: 30.9 PG (ref 27–31)
MCH RBC QN AUTO: 31.1 PG (ref 27–31)
MCH RBC QN AUTO: 31.3 PG (ref 27–31)
MCHC RBC AUTO-ENTMCNC: 33.6 G/DL (ref 32–36)
MCHC RBC AUTO-ENTMCNC: 34 G/DL (ref 32–36)
MCHC RBC AUTO-ENTMCNC: 34.3 G/DL (ref 32–36)
MCHC RBC AUTO-ENTMCNC: 34.7 G/DL (ref 32–36)
MCV RBC AUTO: 90 FL (ref 82–98)
MCV RBC AUTO: 91 FL (ref 82–98)
MONOCYTES # BLD AUTO: 0.4 K/UL (ref 0.3–1)
MONOCYTES # BLD AUTO: 0.4 K/UL (ref 0.3–1)
MONOCYTES # BLD AUTO: 0.5 K/UL (ref 0.3–1)
MONOCYTES # BLD AUTO: 0.6 K/UL (ref 0.3–1)
MONOCYTES NFR BLD: 6 % (ref 4–15)
MONOCYTES NFR BLD: 6.8 % (ref 4–15)
MONOCYTES NFR BLD: 6.9 % (ref 4–15)
MONOCYTES NFR BLD: 7.1 % (ref 4–15)
NEUTROPHILS # BLD AUTO: 4.1 K/UL (ref 1.8–7.7)
NEUTROPHILS # BLD AUTO: 4.4 K/UL (ref 1.8–7.7)
NEUTROPHILS # BLD AUTO: 5 K/UL (ref 1.8–7.7)
NEUTROPHILS # BLD AUTO: 5.8 K/UL (ref 1.8–7.7)
NEUTROPHILS NFR BLD: 61.8 % (ref 38–73)
NEUTROPHILS NFR BLD: 65.1 % (ref 38–73)
NEUTROPHILS NFR BLD: 66.6 % (ref 38–73)
NEUTROPHILS NFR BLD: 73.7 % (ref 38–73)
NRBC BLD-RTO: 0 /100 WBC
PLATELET # BLD AUTO: 150 K/UL (ref 150–450)
PLATELET # BLD AUTO: 154 K/UL (ref 150–450)
PLATELET # BLD AUTO: 159 K/UL (ref 150–450)
PLATELET # BLD AUTO: 169 K/UL (ref 150–450)
PMV BLD AUTO: 10.4 FL (ref 9.2–12.9)
PMV BLD AUTO: 10.6 FL (ref 9.2–12.9)
PMV BLD AUTO: 10.7 FL (ref 9.2–12.9)
PMV BLD AUTO: 10.8 FL (ref 9.2–12.9)
RBC # BLD AUTO: 3.28 M/UL (ref 4–5.4)
RBC # BLD AUTO: 3.35 M/UL (ref 4–5.4)
RBC # BLD AUTO: 3.4 M/UL (ref 4–5.4)
RBC # BLD AUTO: 3.61 M/UL (ref 4–5.4)
WBC # BLD AUTO: 6.11 K/UL (ref 3.9–12.7)
WBC # BLD AUTO: 6.84 K/UL (ref 3.9–12.7)
WBC # BLD AUTO: 7.07 K/UL (ref 3.9–12.7)
WBC # BLD AUTO: 8.82 K/UL (ref 3.9–12.7)

## 2022-12-01 PROCEDURE — 36415 COLL VENOUS BLD VENIPUNCTURE: CPT | Performed by: EMERGENCY MEDICINE

## 2022-12-01 PROCEDURE — 96361 HYDRATE IV INFUSION ADD-ON: CPT

## 2022-12-01 PROCEDURE — 85025 COMPLETE CBC W/AUTO DIFF WBC: CPT | Mod: 91 | Performed by: EMERGENCY MEDICINE

## 2022-12-01 PROCEDURE — G0378 HOSPITAL OBSERVATION PER HR: HCPCS

## 2022-12-01 PROCEDURE — 63600175 PHARM REV CODE 636 W HCPCS: Performed by: EMERGENCY MEDICINE

## 2022-12-01 PROCEDURE — 25000003 PHARM REV CODE 250: Performed by: SPECIALIST

## 2022-12-01 RX ORDER — ACETAMINOPHEN 325 MG/1
650 TABLET ORAL EVERY 6 HOURS PRN
Status: DISCONTINUED | OUTPATIENT
Start: 2022-12-01 | End: 2022-12-01 | Stop reason: HOSPADM

## 2022-12-01 RX ADMIN — SODIUM CHLORIDE, SODIUM LACTATE, POTASSIUM CHLORIDE, AND CALCIUM CHLORIDE: .6; .31; .03; .02 INJECTION, SOLUTION INTRAVENOUS at 06:12

## 2022-12-01 RX ADMIN — SODIUM CHLORIDE, SODIUM LACTATE, POTASSIUM CHLORIDE, AND CALCIUM CHLORIDE: .6; .31; .03; .02 INJECTION, SOLUTION INTRAVENOUS at 12:12

## 2022-12-01 RX ADMIN — ACETAMINOPHEN 650 MG: 325 TABLET ORAL at 10:12

## 2022-12-01 NOTE — ASSESSMENT & PLAN NOTE
Pelvic pain with evidence of free fluid lightly blood.  Urine pregnancy test negative.  IUD in place.  This is likely a hemorrhagic cyst.  Hemoglobin has remained stable since admission.  No evidence of active bleeding.  Pain has diminished significantly now just crampiness.  Patient is hungry.  We will let the patient eat and then I will discharge her home she is to rest for 1 week.  She can resume regular work activities in 1 week.  She is to see me in 2 weeks with a pelvic ultrasound and office visit to follow.  I have discussed with the patient that would not recommend exploratory surgery at this point because she seems to have resolved and this hopefully will be self-limited.  I have advised that most cases like this are self-limiting and less a large active hemorrhagic cyst is visualized.  No evidence of dropping blood levels so patient is stable to go home.

## 2022-12-01 NOTE — PLAN OF CARE
12/01/22 1120   Final Note   Assessment Type Final Discharge Note   Anticipated Discharge Disposition Home   What phone number can be called within the next 1-3 days to see how you are doing after discharge? [phone]   Post-Acute Status   Coverage medicaid   Discharge Delays None known at this time       Discharge orders and chart reviewed with no further post-acute discharge needs identified at this time.  At this time, patient is cleared for discharge from Case Management standpoint.

## 2022-12-01 NOTE — SUBJECTIVE & OBJECTIVE
32-year-old  5 para 5 urine pregnancy test negative presents yesterday 3:00 a.m. with complaints of sudden onset lower abdominal pain.  No nausea vomiting no fever or chills.  Patient has declined and not asked for any pain medicines since admitted here on the floor.  Patient says pain has mostly subsided still feels crampy.  No past medical history on file.  Past Surgical History:   Procedure Laterality Date    Caes       SECTION, CLASSIC      TONSILLECTOMY         PTA Medications   Medication Sig    HYDROcodone-acetaminophen (NORCO) 5-325 mg per tablet Take 1 tablet by mouth every 4 (four) hours as needed.    nitrofurantoin, macrocrystal-monohydrate, (MACROBID) 100 MG capsule Take 1 capsule (100 mg total) by mouth 2 (two) times daily.       Review of patient's allergies indicates:   Allergen Reactions    Penicillins Anaphylaxis        Family History    None       Tobacco Use    Smoking status: Never    Smokeless tobacco: Not on file   Substance and Sexual Activity    Alcohol use: No    Drug use: No    Sexual activity: Not on file     Review of Systems   Objective:     Vital Signs (Most Recent):  Temp: 98.9 °F (37.2 °C) (22)  Pulse: 88 (22)  Resp: 17 (22)  BP: 94/68 (22)  SpO2: 99 % (22) Vital Signs (24h Range):  Temp:  [97.3 °F (36.3 °C)-99.4 °F (37.4 °C)] 98.9 °F (37.2 °C)  Pulse:  [79-99] 88  Resp:  [16-18] 17  SpO2:  [95 %-100 %] 99 %  BP: ()/(51-71) 94/68     Weight: 74.8 kg (165 lb)  Body mass index is 25.09 kg/m².    No LMP recorded (lmp unknown). (Menstrual status: Birth Control).    Physical Exam  Patient is her usual careful Self.  Says pain is much improved.  Vital signs have remained stable she is afebrile.  Not tachycardic.  Abdomen is soft good bowel sounds nondistended.  Nontender palpation.  Pelvic exam is deferred in lieu of pelvic ultrasound done overnight.    Laboratory:  CBC:   Recent Labs   Lab 22  0920   WBC  6.11   RBC 3.35*   HGB 10.5*   HCT 30.3*      MCV 90   MCH 31.3*   MCHC 34.7   Ultrasound demonstrates some free fluid in the lower pelvis.  IUD in place.  Right ovary visualized good blood flow and no evidence of significant cyst.  left ovary nonvisualized on ultrasound as well as CT.  No obviously enlarged cyst on ultrasound or CT on left  Urine pregnancy test negative  Diagnostic Results:  X-Ray: Reviewed  US: Reviewed  CT: Reviewed

## 2022-12-01 NOTE — H&P
[facility]  Obstetrics & Gynecology  History & Physical    Patient Name: Donis Angel  MRN: [id number]  Admission Date: 2022  Primary Care Provider: [facility]    Subjective:     Chief Complaint/Reason for Admission:  Pelvic pain    History of Present Illness:  No notes on file        32-year-old  5 para 5 urine pregnancy test negative presents yesterday 3:00 a.m. with complaints of sudden onset lower abdominal pain.  No nausea vomiting no fever or chills.  Patient has declined and not asked for any pain medicines since admitted here on the floor.  Patient says pain has mostly subsided still feels crampy.  No past medical history on file.  Past Surgical History:   Procedure Laterality Date    Caes       SECTION, CLASSIC      TONSILLECTOMY         PTA Medications   Medication Sig    HYDROcodone-acetaminophen (NORCO) 5-325 mg per tablet Take 1 tablet by mouth every 4 (four) hours as needed.    nitrofurantoin, macrocrystal-monohydrate, (MACROBID) 100 MG capsule Take 1 capsule (100 mg total) by mouth 2 (two) times daily.       Review of patient's allergies indicates:   Allergen Reactions    Penicillins Anaphylaxis        Family History    None       Tobacco Use    Smoking status: Never    Smokeless tobacco: Not on file   Substance and Sexual Activity    Alcohol use: No    Drug use: No    Sexual activity: Not on file     Review of Systems   Objective:     Vital Signs (Most Recent):  Temp: 98.9 °F (37.2 °C) (22)  Pulse: 88 (22)  Resp: 17 (22)  BP: 94/68 (22)  SpO2: 99 % (22) Vital Signs (24h Range):  Temp:  [97.3 °F (36.3 °C)-99.4 °F (37.4 °C)] 98.9 °F (37.2 °C)  Pulse:  [79-99] 88  Resp:  [16-18] 17  SpO2:  [95 %-100 %] 99 %  BP: ()/(51-71) 94/68     Weight: 74.8 kg (165 lb)  Body mass index is 25.09 kg/m².    No LMP recorded (lmp unknown). (Menstrual status: Birth  Control).    Physical Exam  Patient is her usual careful Self.  Says pain is much improved.  Vital signs have remained stable she is afebrile.  Not tachycardic.  Abdomen is soft good bowel sounds nondistended.  Nontender palpation.  Pelvic exam is deferred in lieu of pelvic ultrasound done overnight.    Laboratory:  CBC:   Recent Labs   Lab 12/01/22  0920   WBC 6.11   RBC 3.35*   HGB 10.5*   HCT 30.3*      MCV 90   MCH 31.3*   MCHC 34.7   Ultrasound demonstrates some free fluid in the lower pelvis.  IUD in place.  Right ovary visualized good blood flow and no evidence of significant cyst.  left ovary nonvisualized on ultrasound as well as CT.  No obviously enlarged cyst on ultrasound or CT on left  Urine pregnancy test negative  Diagnostic Results:  X-Ray: Reviewed  US: Reviewed  CT: Reviewed      Assessment/Plan:     * Ruptured cyst of ovary  Pelvic pain with evidence of free fluid lightly blood.  Urine pregnancy test negative.  IUD in place.  This is likely a hemorrhagic cyst.  Hemoglobin has remained stable since admission.  No evidence of active bleeding.  Pain has diminished significantly now just crampiness.  Patient is hungry.  We will let the patient eat and then I will discharge her home she is to rest for 1 week.  She can resume regular work activities in 1 week.  She is to see me in 2 weeks with a pelvic ultrasound and office visit to follow.  I have discussed with the patient that would not recommend exploratory surgery at this point because she seems to have resolved and this hopefully will be self-limited.  I have advised that most cases like this are self-limiting and less a large active hemorrhagic cyst is visualized.  No evidence of dropping blood levels so patient is stable to go home.        Mireya Griffiths MD  Obstetrics & Gynecology  [facility]

## 2022-12-01 NOTE — PLAN OF CARE
Problem: Adult Inpatient Plan of Care  Goal: Plan of Care Review  Outcome: Met  Goal: Patient-Specific Goal (Individualized)  Outcome: Met  Goal: Absence of Hospital-Acquired Illness or Injury  Outcome: Met  Goal: Optimal Comfort and Wellbeing  Outcome: Met  Goal: Readiness for Transition of Care  Outcome: Met     Problem: Pain Acute  Goal: Acceptable Pain Control and Functional Ability  Outcome: Met     Problem: Adult Inpatient Plan of Care  Goal: Plan of Care Review  Outcome: Met  Goal: Patient-Specific Goal (Individualized)  Outcome: Met  Goal: Absence of Hospital-Acquired Illness or Injury  Outcome: Met  Goal: Optimal Comfort and Wellbeing  Outcome: Met  Goal: Readiness for Transition of Care  Outcome: Met     Problem: Pain Acute  Goal: Acceptable Pain Control and Functional Ability  Outcome: Met

## 2022-12-01 NOTE — H&P
[facility] - Emergency Dept  Obstetrics & Gynecology  History & Physical    Patient Name: Donis Angel  MRN: [id number]  Admission Date: 11/30/2022  Primary Care Provider: [facility]    Subjective:     Chief Complaint/Reason for Admission:  pelvic pain    History of Present Illness: History obtained from chart and report from ER Dr Lopez:  This is a new problem. The current episode started 3 to 5 hours before presenting to the ER. The problem occurs constantly. The problem has not changed since onset. Associated symptoms include abdominal pain. Pertinent negatives include no chest pain, no headaches and no shortness of breath. Nothing aggravates the symptoms. Nothing relieves the symptoms. She has tried nothing for the symptoms.     32-year-old female presents complaining of abdominal pain.  Abdominal pain is reported as pelvic on triage however is diffuse in nature.  The patient states the pain was noted upon awakening.  She denies fever or chills.  She denies nausea vomiting or diarrhea.  Bowel movements have been normal for her.  She states she has infrequent bowel movements however this has been her pattern for years.  She is passing gas.  Her appetite and activity level have been normal.  She denies any gastrointestinal bleeding.  She denies fever, chills, body aches or malaise.  She denies any abnormal vaginal discharge,  she has a Mirena. She denies any other problems or complaints.    She has been in the ER for almost 12hrs.  First H/H at 11am 35/12 and repeat H/H at 7:20pm 31.8/10/9    CT and US consistent with ruptured ovarian cyst with blood in her pelvis.  Discussed with ER Dr Lopez that H/H stable over the 8 hrs and the pt can be discharged and follow-up with her gyn in the AM, but ER Dr Lopez feels pt is too weak and BP dropped while trying to ambulate, even with fluids, and should be observed overnight.    I will admit and get serial CBC's every  3 yrs and take to the OR if needed.    No current facility-administered medications on file prior to encounter.     Current Outpatient Medications on File Prior to Encounter   Medication Sig    HYDROcodone-acetaminophen (NORCO) 5-325 mg per tablet Take 1 tablet by mouth every 4 (four) hours as needed.    nitrofurantoin, macrocrystal-monohydrate, (MACROBID) 100 MG capsule Take 1 capsule (100 mg total) by mouth 2 (two) times daily.       Review of patient's allergies indicates:   Allergen Reactions    Penicillins Anaphylaxis       No past medical history on file.  OB History    Para Term  AB Living   1             SAB IAB Ectopic Multiple Live Births                  # Outcome Date GA Lbr Cirilo/2nd Weight Sex Delivery Anes PTL Lv   1               Past Surgical History:   Procedure Laterality Date    Caes       SECTION, CLASSIC      TONSILLECTOMY       Family History    None       Tobacco Use    Smoking status: Never    Smokeless tobacco: Not on file   Substance and Sexual Activity    Alcohol use: No    Drug use: No    Sexual activity: Not on file     Review of Systems  Objective:     Vital Signs (Most Recent):  Temp: 97.9 °F (36.6 °C) (22 1020)  Pulse: 99 (22 2150)  Resp: 16 (22 1020)  BP: 114/68 (22 2150)  SpO2: 100 % (22 2150) Vital Signs (24h Range):  Temp:  [97.9 °F (36.6 °C)] 97.9 °F (36.6 °C)  Pulse:  [84-99] 99  Resp:  [16] 16  SpO2:  [99 %-100 %] 100 %  BP: ()/(51-71) 114/68     Weight: 74.8 kg (165 lb)  Body mass index is 25.09 kg/m².  No LMP recorded.    Physical Exam    Laboratory:  CBC:   Recent Labs   Lab 22  1921   WBC 10.19   RBC 3.54*   HGB 10.9*   HCT 31.8*      MCV 90   MCH 30.8   MCHC 34.3       Diagnostic Results:  EXAM: US PELVIS COMP WITH TRANSVAG NON-OB (XPD)     INDICATION:   PELVIC PAIN     COMPARISON:  CT abdomen and pelvis of the same date.     TECHNIQUE:  Transabdominal and transvaginal scanning was performed with  grayscale and color Doppler imaging.     FINDINGS:     Uterus:  Measures  9.5   x  4.6    x  6.4    cm. Multiple cysts are seen in the myometrium.     Endometrium: IUD in the endometrial canal.     Right ovary measures   3.9   x  2    x  2.8    cm. No right ovarian or adnexal masses are identified. Normal blood flow.     Left ovary was not visualized.     Free fluid:  Mixed echogenicity fluid in the pelvis.     Other findings:  None of significance.     IMPRESSION:     Mixed echogenicity fluid in the pelvis concerning for blood products.     Electronically signed by:  Vivian Shelton MD  11/30/2022 8:33 PM CST     Assessment/Plan:     There are no hospital problems to display for this patient.    Ruptured and bleeding ovarian cyst  Serial CBC's every 3hrs  Take to the OR if needed    Radha Dunbar MD  Obstetrics & Gynecology  [facility] - Emergency Dept

## 2022-12-01 NOTE — DISCHARGE SUMMARY
[facility]  Obstetrics & Gynecology  Discharge Summary    Patient Name: Donis Angel  MRN: [id number]  Admission Date: 11/30/2022  Hospital Length of Stay: 0 days  Discharge Date and Time:  12/01/2022 10:09 AM  Attending Physician: Mireya Griffiths MD   Discharging Provider: Mireya Griffiths MD  Primary Care Provider: [facility]    HPI:  No notes on file    Hospital Course:  Admitted with sudden onset lower abdominal pain.  Serial hemoglobin has remained stable.  CT and ultrasound does demonstrate some free fluid.  Left ovary not visualized.  Patient never had a nausea vomiting and is not having any more pain now.  Suspected ruptured ovarian cyst.  Appears to be self-limited.    Goals of Care Treatment Preferences:  Code Status: Full Code      * No surgery found *     Consults (From admission, onward)        Status Ordering Provider     Inpatient consult to OB/GYN  Once        Provider:  Radha Dunbar MD    Acknowledged JOSE JUAN CLARK      Physical exam:  Patient doing well very cheerful feeling much better   Vital signs are stable afebrile no evidence tachycardia or hypotension  Abdomen is soft nontender to palpation.    Assessment plan:   Suspected ruptured hemorrhagic cyst self-limited.  Discussed my recommendations follow-up and have her rest for 1 week.  Follow-up ultrasound 2 weeks.  No evidence of active bleeding with serial H&H and improvement in pain.    Significant Diagnostic Studies: Labs:  UPT negative  CBC   Recent Labs   Lab 12/01/22  0255 12/01/22  0608 12/01/22  0920   WBC 8.82 7.07 6.11   HGB 10.2* 10.5* 10.5*   HCT 29.7* 30.9* 30.3*    159 154    and All labs within the past 24 hours have been reviewed  Lab Results   Component Value Date    GROUPTRH O POS 11/30/2022         Pending Diagnostic Studies:     Procedure Component Value Units Date/Time    CBC auto differential [[id number]]     Order Status: Sent Lab Status: No result      Specimen: Blood     CBC auto differential [[id number]]     Order Status: Sent Lab Status: No result     Specimen: Blood         Final Active Diagnoses:    Diagnosis Date Noted POA    PRINCIPAL PROBLEM:  Ruptured cyst of ovary [N83.209] 2022 Yes      Problems Resolved During this Admission:        Discharged Condition: good    Disposition: Home or Self Care    Follow Up:    Patient Instructions:      Diet Adult Regular     No driving until:   Order Comments: 14 days for vaginal delivery  28 days for  Section     No dressing needed     Activity as tolerated   Order Comments: Pelvic rest x 2 weeks; no work for 1 week.     Medications:  Reconciled Home Medications:      Medication List      ASK your doctor about these medications    HYDROcodone-acetaminophen 5-325 mg per tablet  Commonly known as: NORCO  Take 1 tablet by mouth every 4 (four) hours as needed.     nitrofurantoin (macrocrystal-monohydrate) 100 MG capsule  Commonly known as: MACROBID  Take 1 capsule (100 mg total) by mouth 2 (two) times daily.            Mireya Griffiths MD  Obstetrics & Gynecology  [facility]

## 2022-12-01 NOTE — PLAN OF CARE
[facility]  Initial Discharge Assessment       Primary Care Provider: [facility]    Admission Diagnosis: Pelvic pain [R10.2]    Admission Date: 11/30/2022  Expected Discharge Date: 12/1/2022         Payor: MEDICAID / Plan: [facility] / Product Type: Managed Medicaid /     Extended Emergency Contact Information  Primary Emergency Contact: Gemini,Mckay  Address: [address] [place] [address] [facility]  Home Phone: [phone]  Mobile Phone: [phone]  Relation: Significant other  Preferred language: English   needed? No  Secondary Emergency Contact: Melissa Lorenzana  Address: [address] [place], [place] [id number] [place] [address]  Home Phone: [phone]  Mobile Phone: [phone]  Relation: Mother   needed? No    Discharge Plan A: Home, Home with family  Discharge Plan B: Home, Home with family    No Pharmacies Listed    Initial Assessment (most recent)       Adult Discharge Assessment - 12/01/22 1118          Discharge Assessment    Assessment Type Discharge Planning Assessment     Confirmed/corrected address, phone number and insurance Yes     Confirmed Demographics Correct on Facesheet     Source of Information health record;patient     Does patient/caregiver understand observation status Yes     Communicated LILI with patient/caregiver Yes     Reason For Admission ruptured cyst of ovary     Lives With significant other     Facility Arrived From: home     Do you expect to return to your current living situation? Yes     Do you have help at home or someone to help you manage your care at home? Yes     Who are your caregiver(s) and their phone number(s)? s/o     Prior to hospitilization cognitive status: Alert/Oriented     Current cognitive status: Alert/Oriented     Walking or Climbing Stairs Difficulty none     Dressing/Bathing Difficulty none      Equipment Currently Used at Home none     Readmission within 30 days? No     Patient currently being followed by outpatient case management? No     Do you currently have service(s) that help you manage your care at home? No     Do you take prescription medications? Yes     Do you have prescription coverage? Yes     Coverage medicaid     Do you have any problems affording any of your prescribed medications? No     Is the patient taking medications as prescribed? yes     Who is going to help you get home at discharge? s/o & self     How do you get to doctors appointments? car, drives self     Are you on dialysis? No     Do you take coumadin? No     Discharge Plan A Home;Home with family     Discharge Plan B Home;Home with family     DME Needed Upon Discharge  none

## 2022-12-01 NOTE — NURSING
Patient admitted to 2116 from ED. Oriented to unit/room. Denies vaginal bleeding, pain moderately controlled, vital signs stable, see flowsheet for assessment. Instructed to call for needs/assistance.

## 2022-12-01 NOTE — NURSING
Thorough discharge and education provided to patient. Patient verbalized understanding of education, follow up appointments, how to start/resume medications, where to  medications. No questions or concerns. Discharge instructions placed in white folder. IV disconnected. Wheelchair offered. Patient declined. Ambulated off unit with personal belongings and white folder in tow.

## 2022-12-02 LAB
CHLAMYDIA, AMPLIFIED DNA: NEGATIVE
N GONORRHOEAE, AMPLIFIED DNA: NEGATIVE

## 2022-12-04 LAB
BACTERIA GENITAL AEROBE CULT: NORMAL
GRAM STN SPEC: NORMAL

## 2023-02-14 ENCOUNTER — TELEPHONE (OUTPATIENT)
Dept: PAIN MEDICINE | Facility: CLINIC | Age: 33
End: 2023-02-14
Payer: MEDICAID

## 2023-02-14 NOTE — TELEPHONE ENCOUNTER
Pt is out of service area lives in [facility] called to tell her that left message. Left message stating [facility] also has no appts for over 6 months no schedule out. Advised on VM to call her Insurance company for a referral outside .

## 2023-02-14 NOTE — TELEPHONE ENCOUNTER
----- Message from Batsheva Rosenberg sent at 2/14/2023  9:37 AM CST -----  Regarding: pt call back  Name of Who is Calling:SHERRY MENDEZ [[id number]]           What is the request in detail: Pt would like a call back soon as possible she wants to schedule an appt            Can the clinic reply by [facility]:           What Number to Call Back if not in [facility]: [phone]

## 2023-11-15 ENCOUNTER — HOSPITAL ENCOUNTER (OUTPATIENT)
Facility: HOSPITAL | Age: 33
Discharge: HOME | End: 2023-11-16
Attending: EMERGENCY MEDICINE | Admitting: SPECIALIST
Payer: MEDICAID

## 2023-11-15 DIAGNOSIS — N93.9 VAGINAL BLEEDING: Primary | ICD-10-CM

## 2023-11-15 LAB
ABO + RH BLD: NORMAL
ALBUMIN SERPL BCP-MCNC: 4.8 G/DL (ref 3.5–5.2)
ALP SERPL-CCNC: 61 U/L (ref 55–135)
ALT SERPL W/O P-5'-P-CCNC: 8 U/L (ref 10–44)
ANION GAP SERPL CALC-SCNC: 5 MMOL/L (ref 8–16)
AST SERPL-CCNC: 13 U/L (ref 10–40)
B-HCG UR QL: NEGATIVE
BACTERIA #/AREA URNS HPF: NEGATIVE /HPF
BACTERIA GENITAL QL WET PREP: NORMAL
BASOPHILS # BLD AUTO: 0.05 K/UL (ref 0–0.2)
BASOPHILS NFR BLD: 0.6 % (ref 0–1.9)
BILIRUB SERPL-MCNC: 0.6 MG/DL (ref 0.1–1)
BILIRUB UR QL STRIP: NEGATIVE
BLD GP AB SCN CELLS X3 SERPL QL: NORMAL
BUN SERPL-MCNC: 15 MG/DL (ref 6–20)
CALCIUM SERPL-MCNC: 9.7 MG/DL (ref 8.7–10.5)
CHLORIDE SERPL-SCNC: 105 MMOL/L (ref 95–110)
CLARITY UR: ABNORMAL
CLUE CELLS VAG QL WET PREP: NORMAL
CO2 SERPL-SCNC: 27 MMOL/L (ref 23–29)
COLOR UR: ABNORMAL
CREAT SERPL-MCNC: 0.8 MG/DL (ref 0.5–1.4)
CTP QC/QA: YES
DIFFERENTIAL METHOD: NORMAL
EOSINOPHIL # BLD AUTO: 0 K/UL (ref 0–0.5)
EOSINOPHIL NFR BLD: 0.3 % (ref 0–8)
ERYTHROCYTE [DISTWIDTH] IN BLOOD BY AUTOMATED COUNT: 12.6 % (ref 11.5–14.5)
EST. GFR  (NO RACE VARIABLE): >60 ML/MIN/1.73 M^2
FILAMENT FUNGI VAG WET PREP-#/AREA: NORMAL
GLUCOSE SERPL-MCNC: 94 MG/DL (ref 70–110)
GLUCOSE UR QL STRIP: NEGATIVE
HCT VFR BLD AUTO: 40.1 % (ref 37–48.5)
HGB BLD-MCNC: 13.8 G/DL (ref 12–16)
HGB UR QL STRIP: ABNORMAL
HYALINE CASTS #/AREA URNS LPF: 2 /LPF
IMM GRANULOCYTES # BLD AUTO: 0.03 K/UL (ref 0–0.04)
IMM GRANULOCYTES NFR BLD AUTO: 0.4 % (ref 0–0.5)
KETONES UR QL STRIP: NEGATIVE
LEUKOCYTE ESTERASE UR QL STRIP: NEGATIVE
LYMPHOCYTES # BLD AUTO: 1.7 K/UL (ref 1–4.8)
LYMPHOCYTES NFR BLD: 21.7 % (ref 18–48)
MCH RBC QN AUTO: 31 PG (ref 27–31)
MCHC RBC AUTO-ENTMCNC: 34.4 G/DL (ref 32–36)
MCV RBC AUTO: 90 FL (ref 82–98)
MICROSCOPIC COMMENT: ABNORMAL
MONOCYTES # BLD AUTO: 0.6 K/UL (ref 0.3–1)
MONOCYTES NFR BLD: 7.3 % (ref 4–15)
NEUTROPHILS # BLD AUTO: 5.6 K/UL (ref 1.8–7.7)
NEUTROPHILS NFR BLD: 69.7 % (ref 38–73)
NITRITE UR QL STRIP: NEGATIVE
NRBC BLD-RTO: 0 /100 WBC
PH UR STRIP: 6 [PH] (ref 5–8)
PLATELET # BLD AUTO: 204 K/UL (ref 150–450)
PMV BLD AUTO: 10.6 FL (ref 9.2–12.9)
POTASSIUM SERPL-SCNC: 3.6 MMOL/L (ref 3.5–5.1)
PROT SERPL-MCNC: 7.7 G/DL (ref 6–8.4)
PROT UR QL STRIP: ABNORMAL
RBC # BLD AUTO: 4.45 M/UL (ref 4–5.4)
RBC #/AREA URNS HPF: >100 /HPF (ref 0–4)
SODIUM SERPL-SCNC: 137 MMOL/L (ref 136–145)
SP GR UR STRIP: 1.02 (ref 1–1.03)
SPECIMEN OUTDATE: NORMAL
SPECIMEN SOURCE: NORMAL
SQUAMOUS #/AREA URNS HPF: 2 /HPF
T VAGINALIS GENITAL QL WET PREP: NORMAL
URN SPEC COLLECT METH UR: ABNORMAL
UROBILINOGEN UR STRIP-ACNC: NEGATIVE EU/DL
WBC # BLD AUTO: 7.97 K/UL (ref 3.9–12.7)
WBC #/AREA URNS HPF: 1 /HPF (ref 0–5)
WBC #/AREA VAG WET PREP: NORMAL
YEAST GENITAL QL WET PREP: NORMAL

## 2023-11-15 PROCEDURE — 25000003 PHARM REV CODE 250: Performed by: SPECIALIST

## 2023-11-15 PROCEDURE — G0378 HOSPITAL OBSERVATION PER HR: HCPCS

## 2023-11-15 PROCEDURE — 87210 SMEAR WET MOUNT SALINE/INK: CPT | Performed by: EMERGENCY MEDICINE

## 2023-11-15 PROCEDURE — 99285 EMERGENCY DEPT VISIT HI MDM: CPT | Mod: 25

## 2023-11-15 PROCEDURE — 85025 COMPLETE CBC W/AUTO DIFF WBC: CPT | Performed by: NURSE PRACTITIONER

## 2023-11-15 PROCEDURE — 81001 URINALYSIS AUTO W/SCOPE: CPT | Performed by: NURSE PRACTITIONER

## 2023-11-15 PROCEDURE — 81025 URINE PREGNANCY TEST: CPT | Performed by: NURSE PRACTITIONER

## 2023-11-15 PROCEDURE — 87205 SMEAR GRAM STAIN: CPT | Performed by: EMERGENCY MEDICINE

## 2023-11-15 PROCEDURE — 86901 BLOOD TYPING SEROLOGIC RH(D): CPT | Performed by: EMERGENCY MEDICINE

## 2023-11-15 PROCEDURE — 87081 CULTURE SCREEN ONLY: CPT | Performed by: EMERGENCY MEDICINE

## 2023-11-15 PROCEDURE — 80053 COMPREHEN METABOLIC PANEL: CPT | Performed by: NURSE PRACTITIONER

## 2023-11-15 PROCEDURE — 87591 N.GONORRHOEAE DNA AMP PROB: CPT | Performed by: EMERGENCY MEDICINE

## 2023-11-15 PROCEDURE — 25000003 PHARM REV CODE 250: Performed by: EMERGENCY MEDICINE

## 2023-11-15 RX ORDER — MEGESTROL ACETATE 20 MG/1
40 TABLET ORAL DAILY
Status: DISCONTINUED | OUTPATIENT
Start: 2023-11-15 | End: 2023-11-15

## 2023-11-15 RX ORDER — GABAPENTIN 300 MG/1
300 CAPSULE ORAL NIGHTLY PRN
COMMUNITY
Start: 2023-11-12

## 2023-11-15 RX ORDER — SULFAMETHOXAZOLE AND TRIMETHOPRIM 800; 160 MG/1; MG/1
1 TABLET ORAL 2 TIMES DAILY
COMMUNITY
Start: 2023-11-14

## 2023-11-15 RX ORDER — MEGESTROL ACETATE 20 MG/1
40 TABLET ORAL DAILY
Status: DISCONTINUED | OUTPATIENT
Start: 2023-11-16 | End: 2023-11-15

## 2023-11-15 RX ORDER — FLUCONAZOLE 150 MG/1
150 TABLET ORAL
COMMUNITY
Start: 2023-11-14

## 2023-11-15 RX ORDER — MEGESTROL ACETATE 20 MG/1
40 TABLET ORAL ONCE
Status: COMPLETED | OUTPATIENT
Start: 2023-11-15 | End: 2023-11-15

## 2023-11-15 RX ORDER — GABAPENTIN 100 MG/1
100 CAPSULE ORAL 3 TIMES DAILY PRN
COMMUNITY
Start: 2023-11-05

## 2023-11-15 RX ORDER — MEGESTROL ACETATE 20 MG/1
40 TABLET ORAL 2 TIMES DAILY
Status: DISCONTINUED | OUTPATIENT
Start: 2023-11-15 | End: 2023-11-16 | Stop reason: HOSPADM

## 2023-11-15 RX ORDER — CEPHALEXIN 500 MG/1
500 CAPSULE ORAL 2 TIMES DAILY
COMMUNITY
Start: 2023-11-14

## 2023-11-15 RX ORDER — SODIUM CHLORIDE 0.9 % (FLUSH) 0.9 %
10 SYRINGE (ML) INJECTION
Status: DISCONTINUED | OUTPATIENT
Start: 2023-11-15 | End: 2023-11-16 | Stop reason: HOSPADM

## 2023-11-15 RX ADMIN — MEGESTROL ACETATE 40 MG: 20 TABLET ORAL at 05:11

## 2023-11-15 NOTE — ASSESSMENT & PLAN NOTE
Acute vaginal bleeding today.  No obvious inciting event.  No obvious abnormal physical exam findings currently.  Vaginal bleeding seems to have slept up quite a bit.  I have discussed with the patient the options.  Being that we are not actively bleeding currently.  And her H and H is stable I have recommended that we observe her overnight to see if this happens again.  We do not have an explanation for this acute episode of vaginal heavy bleeding.  She did have her IUD removed 2 days ago.  She did have intercourse almost 24 hours ago at this point.  But nothing else.  This happened mid day spontaneously.  So again I would like to observe her make sure this does not spontaneously happened again.  Ultrasound is within normal limits.  I discussed that could possibly do a D&C tomorrow with a hysteroscopy and take a look inside the uterus and see if I see any evidence of polyps or reasoning for this.  Patient is very concerned and does not want to have surgery.  I have discussed these options and if she does not have any significant bleeding overnight then she could probably go home.  Patient was given Megace 40 mg x 1 and I will repeat a 40 mg dose tonight and then she will repeat that daily.  I am recommend that she stay on that for 2-4 weeks at this point.  Patient is unsure about what she wants to do for future birth control.  And we are in the midst of trying to figure that out together.  She did not like the IUD.  In addition patient has a right non toxic appearing Bartholin's cyst.  It is a small egg size.  She started antibiotics for that yesterday no would like her to continue that.  I have offered her if I do take her back for the D and C to go ahead and drain this.  She asked me how I knew this was not a cancerous cyst and I advised her that these very rarely less than 1% of the time are cancerous and especially in her age group.  Patient to consider options overnight.  She is allowed to eat dinner now and  then nothing to eat after midnight.

## 2023-11-15 NOTE — SUBJECTIVE & OBJECTIVE
33-year-old  4 para 5 with previous history of twin gestation presents 2 days after removal of IUD.  She did have intercourse last night.  And then had sudden onset of bright red bleeding around noon today.  She presented to the office.  We were about to work her in and then found that she was in the bathroom stall and bleeding was quite brisk and heavy and patient was advised to go to the emergency room.  Patient notes no other inciting event.  In addition patient also has a known right small Bartholin's cyst on the right vulva for which I prescribed antibiotics and she started taking today.    OB History    Para Term  AB Living   1 0 0 0 0 0   SAB IAB Ectopic Multiple Live Births   0 0 0 0 0      # Outcome Date GA Lbr Cirilo/2nd Weight Sex Delivery Anes PTL Lv   1               No past medical history on file.  Past Surgical History:   Procedure Laterality Date    Caes       SECTION, CLASSIC      TONSILLECTOMY         (Not in a hospital admission)      Review of patient's allergies indicates:   Allergen Reactions    Penicillins Anaphylaxis        Family History    None       Tobacco Use    Smoking status: Never    Smokeless tobacco: Not on file   Substance and Sexual Activity    Alcohol use: No    Drug use: No    Sexual activity: Not on file     Review of Systems   Objective:     Vital Signs (Most Recent):  Temp: 98.5 °F (36.9 °C) (11/15/23 1318)  Pulse: 91 (11/15/23 1318)  Resp: 17 (11/15/23 1318)  BP: 128/70 (11/15/23 1318)  SpO2: 98 % (11/15/23 1318) Vital Signs (24h Range):  Temp:  [98.5 °F (36.9 °C)] 98.5 °F (36.9 °C)  Pulse:  [91] 91  Resp:  [17] 17  SpO2:  [98 %] 98 %  BP: (128)/(70) 128/70     Weight: 72.6 kg (160 lb)  Body mass index is 25.06 kg/m².    No LMP recorded. (Menstrual status: Birth Control).     Physical Exam  Patient was examined by the nurse practitioner and then I came to examine the patient.  The nurse practitioner report was that there was quite a lot of  "mountain blood in the vaginal vault.  But no lacerations.  When I saw the patient she was quite upset she does not understand why this happened.  I advised her I just do not know as well.  Patient is alert and oriented in no apparent distress.  Abdomen is soft nontender palpation.  Pelvic exam which I did for quite some time demonstrated a very small amount of old blood in the vaginal vault.  And I watched the cervix for some time.  Small amount of blood would come out of the cervix episodically but not brisk at all.  It appeared dark and old.  I watched for a good 5 minutes.  I then had the patient get up and walk around because she described the bleeding as worse as she walks around and ambulates.  She had minimal to no bleeding.     Laboratory:  CBC:   Recent Labs   Lab 11/15/23  1343   WBC 7.97   RBC 4.45   HGB 13.8   HCT 40.1      MCV 90   MCH 31.0   MCHC 34.4     Urine Pregnancy Test: No results for input(s): "PREGTESTUR" in the last 48 hours.  Recent Labs   Lab 11/15/23  1347   COLORU Light Red   SPECGRAV 1.025   PHUR 6.0   PROTEINUA Trace*   BACTERIA Negative   NITRITE Negative   LEUKOCYTESUR Negative   UROBILINOGEN Negative   HYALINECASTS 2*   Urine pregnancy test negative.    Diagnostic Results:  Transvaginal ultrasound demonstrates uterus measuring 8.5 x 5 x 6.5 endometrial lining of 8 mm no intrauterine inferiorly connections no myometrial masses right and left ovary both identified and had good flow.  Small amount of free fluid in the pelvis.  Essentially normal ultrasound.  "

## 2023-11-15 NOTE — H&P
[facility] - Emergency Dept  Obstetrics & Gynecology  History & Physical    Patient Name: Donis Angel  MRN: [id number]  Admission Date: 11/15/2023  Primary [facility] Provider: [facility]    Subjective:     Chief Complaint/Reason for Admission:  Heavy vaginal bleed    History of Present Illness:  No notes on file    33-year-old  4 para 5 with previous history of twin gestation presents 2 days after removal of IUD.  She did have intercourse last night.  And then had sudden onset of bright red bleeding around noon today.  She presented to the office.  We were about to work her in and then found that she was in the bathroom stall and bleeding was quite brisk and heavy and patient was advised to go to the emergency room.  Patient notes no other inciting event.  In addition patient also has a known right small Bartholin's cyst on the right vulva for which I prescribed antibiotics and she started taking today.    OB History    Para Term  AB Living   1 0 0 0 0 0   SAB IAB Ectopic Multiple Live Births   0 0 0 0 0      # Outcome Date GA Lbr Cirilo/2nd Weight Sex Delivery Anes PTL Lv   1               No past medical history on file.  Past Surgical History:   Procedure Laterality Date    Caes       SECTION, CLASSIC      TONSILLECTOMY         (Not in a hospital admission)      Review of patient's allergies indicates:   Allergen Reactions    Penicillins Anaphylaxis        Family History    None       Tobacco Use    Smoking status: Never    Smokeless tobacco: Not on file   Substance and Sexual Activity    Alcohol use: No    Drug use: No    Sexual activity: Not on file     Review of Systems   Objective:     Vital Signs (Most Recent):  Temp: 98.5 °F (36.9 °C) (11/15/23 1318)  Pulse: 91 (11/15/23 1318)  Resp: 17 (11/15/23 1318)  BP: 128/70 (11/15/23 1318)  SpO2: 98 % (11/15/23 1318) Vital Signs (24h Range):  Temp:  [98.5 °F (36.9 °C)] 98.5 °F  "(36.9 °C)  Pulse:  [91] 91  Resp:  [17] 17  SpO2:  [98 %] 98 %  BP: (128)/(70) 128/70     Weight: 72.6 kg (160 lb)  Body mass index is 25.06 kg/m².    No LMP recorded. (Menstrual status: Birth Control).     Physical Exam  Patient was examined by the nurse practitioner and then I came to examine the patient.  The nurse practitioner report was that there was quite a lot of mountain blood in the vaginal vault.  But no lacerations.  When I saw the patient she was quite upset she does not understand why this happened.  I advised her I just do not know as well.  Patient is alert and oriented in no apparent distress.  Abdomen is soft nontender palpation.  Pelvic exam which I did for quite some time demonstrated a very small amount of old blood in the vaginal vault.  And I watched the cervix for some time.  Small amount of blood would come out of the cervix episodically but not brisk at all.  It appeared dark and old.  I watched for a good 5 minutes.  I then had the patient get up and walk around because she described the bleeding as worse as she walks around and ambulates.  She had minimal to no bleeding.     Laboratory:  CBC:   Recent Labs   Lab 11/15/23  1343   WBC 7.97   RBC 4.45   HGB 13.8   HCT 40.1      MCV 90   MCH 31.0   MCHC 34.4     Urine Pregnancy Test: No results for input(s): "PREGTESTUR" in the last 48 hours.  Recent Labs   Lab 11/15/23  1347   COLORU Light Red   SPECGRAV 1.025   PHUR 6.0   PROTEINUA Trace*   BACTERIA Negative   NITRITE Negative   LEUKOCYTESUR Negative   UROBILINOGEN Negative   HYALINECASTS 2*   Urine pregnancy test negative.    Diagnostic Results:  Transvaginal ultrasound demonstrates uterus measuring 8.5 x 5 x 6.5 endometrial lining of 8 mm no intrauterine inferiorly connections no myometrial masses right and left ovary both identified and had good flow.  Small amount of free fluid in the pelvis.  Essentially normal ultrasound.  Assessment/Plan:     Renal/  * Vaginal " bleeding  Acute vaginal bleeding today.  No obvious inciting event.  No obvious abnormal physical exam findings currently.  Vaginal bleeding seems to have slept up quite a bit.  I have discussed with the patient the options.  Being that we are not actively bleeding currently.  And her H and H is stable I have recommended that we observe her overnight to see if this happens again.  We do not have an explanation for this acute episode of vaginal heavy bleeding.  She did have her IUD removed 2 days ago.  She did have intercourse almost 24 hours ago at this point.  But nothing else.  This happened mid day spontaneously.  So again I would like to observe her make sure this does not spontaneously happened again.  Ultrasound is within normal limits.  I discussed that could possibly do a D&C tomorrow with a hysteroscopy and take a look inside the uterus and see if I see any evidence of polyps or reasoning for this.  Patient is very concerned and does not want to have surgery.  I have discussed these options and if she does not have any significant bleeding overnight then she could probably go home.  Patient was given Megace 40 mg x 1 and I will repeat a 40 mg dose tonight and then she will repeat that daily.  I am recommend that she stay on that for 2-4 weeks at this point.  Patient is unsure about what she wants to do for future birth control.  And we are in the midst of trying to figure that out together.  She did not like the IUD.  In addition patient has a right non toxic appearing Bartholin's cyst.  It is a small egg size.  She started antibiotics for that yesterday no would like her to continue that.  I have offered her if I do take her back for the D and C to go ahead and drain this.  She asked me how I knew this was not a cancerous cyst and I advised her that these very rarely less than 1% of the time are cancerous and especially in her age group.  Patient to consider options overnight.  She is allowed to eat dinner  now and then nothing to eat after midnight.        COMPLETED  Family history is reviewed and has not changed     Mireya Griffiths MD  Obstetrics & Gynecology  [facility] - Emergency Dept

## 2023-11-15 NOTE — ED PROVIDER NOTES
Encounter Date: 11/15/2023       History     Chief Complaint   Patient presents with    Vaginal Bleeding     Since this AM. Had mirena removed 2 days ago. Bleeding is heavy w/ clots per pt     33-year-old female presents emergency department with vaginal bleeding.  She says that she had IUD removed 2 days ago.  Did not have any bleeding but about an hour ago she says she started to have significant amount of bright red blood.  She says she is been given 1 pad and has saturated it.  Says she was at the OBGYN office, Dr. Palacios goes and the nurse there told her that the bleeding was not normal and to go to the ER.  Patient does not feel lightheaded has not passed out.  She is not on any blood thinners.  She has no significant past medical history.  Denies any trauma to her vagina or any vigorous intercourse.  She has not had this issue in the past.      Review of patient's allergies indicates:   Allergen Reactions    Penicillins Anaphylaxis     No past medical history on file.  Past Surgical History:   Procedure Laterality Date    Caes       SECTION, CLASSIC      TONSILLECTOMY       No family history on file.  Social History     Tobacco Use    Smoking status: Never   Substance Use Topics    Alcohol use: No    Drug use: No     Review of Systems   Constitutional:  Negative for chills and fever.   HENT:  Negative for sore throat.    Respiratory:  Negative for shortness of breath.    Cardiovascular:  Negative for chest pain and palpitations.   Gastrointestinal:  Negative for abdominal pain, nausea and vomiting.   Genitourinary:  Positive for pelvic pain and vaginal bleeding. Negative for dysuria.   Musculoskeletal:  Negative for back pain.   Skin:  Negative for rash.   Neurological:  Negative for weakness.   Hematological:  Does not bruise/bleed easily.   All other systems reviewed and are negative.      Physical Exam     Initial Vitals [11/15/23 1318]   BP Pulse Resp Temp SpO2   128/70 91 17 98.5 °F (36.9 °C) 98 %       MAP       --         Physical Exam    Nursing note and vitals reviewed.  Constitutional: She appears well-developed and well-nourished. No distress.   HENT:   Head: Normocephalic and atraumatic.   Mouth/Throat: No oropharyngeal exudate.   Eyes: Conjunctivae and EOM are normal. Pupils are equal, round, and reactive to light.   Neck: Neck supple. No tracheal deviation present.   Cardiovascular:  Normal rate, regular rhythm, normal heart sounds and intact distal pulses.           No murmur heard.  Pulmonary/Chest: Breath sounds normal. No stridor. No respiratory distress. She has no wheezes. She has no rhonchi. She has no rales.   Abdominal: Abdomen is soft. She exhibits no distension. There is no abdominal tenderness. There is no rebound and no guarding.   Genitourinary:    Pelvic exam was performed with patient supine.   There is lesion on the right labia. There is no rash, tenderness or injury on the right labia. There is no rash, tenderness, lesion or injury on the left labia. Right adnexum displays no mass and no tenderness. Left adnexum displays no mass and no tenderness.    Vaginal bleeding present.      No vaginal discharge, erythema or tenderness.   There is bleeding in the vagina. No erythema or tenderness in the vagina.    No foreign body in the vagina.      No signs of injury in the vagina.      Genitourinary Comments: Patient requesting female for  exam.   exam performed by Maegan Madera NP with JEFFREY Flower at bedside.    Moderate amount of bleeding noted in patient's vaginal canal.  Bleeding appears to be coming from closed cervix.  No lacerations or injuries noted in patient's vaginal area or on her cervix.  Patient noted to have a small cyst on her right labia that patient denies has painful.     Musculoskeletal:         General: No tenderness or edema. Normal range of motion.      Cervical back: Neck supple.     Neurological: She is alert and oriented to person, place, and time. She has normal  strength. No cranial nerve deficit or sensory deficit.   Skin: Skin is warm and dry. Capillary refill takes less than 2 seconds. No rash noted. No erythema. No pallor.   Psychiatric: She has a normal mood and affect. Her behavior is normal. Thought content normal.         ED Course   Procedures  Labs Reviewed   COMPREHENSIVE METABOLIC PANEL - Abnormal; Notable for the following components:       Result Value    ALT 8 (*)     Anion Gap 5 (*)     All other components within normal limits   URINALYSIS, REFLEX TO URINE CULTURE - Abnormal; Notable for the following components:    Appearance, UA Cloudy (*)     Protein, UA Trace (*)     Occult Blood UA 3+ (*)     All other components within normal limits    Narrative:     Specimen Source->Urine   URINALYSIS MICROSCOPIC - Abnormal; Notable for the following components:    RBC, UA >100 (*)     Hyaline Casts, UA 2 (*)     All other components within normal limits    Narrative:     Specimen Source->Urine   VAGINAL SCREEN   C. TRACHOMATIS/N. GONORRHOEAE BY AMP DNA   CULTURE, GONOCOCCUS   CBC W/ AUTO DIFFERENTIAL   POCT URINE PREGNANCY   TYPE & SCREEN          Results for orders placed or performed during the hospital encounter of 11/15/23   Vaginal Screen    Specimen: Vaginal swab   Result Value Ref Range    Trichomonas None None    Clue Cells None None    Budding Yeast None None    Fungal Hyphae None None    WBC - Vaginal Screen None None    Bacteria - Vaginal Screen None None    Wet Prep Source Vagina    CBC W/ AUTO DIFFERENTIAL   Result Value Ref Range    WBC 7.97 3.90 - 12.70 K/uL    RBC 4.45 4.00 - 5.40 M/uL    Hemoglobin 13.8 12.0 - 16.0 g/dL    Hematocrit 40.1 37.0 - 48.5 %    MCV 90 82 - 98 fL    MCH 31.0 27.0 - 31.0 pg    MCHC 34.4 32.0 - 36.0 g/dL    RDW 12.6 11.5 - 14.5 %    Platelets 204 150 - 450 K/uL    MPV 10.6 9.2 - 12.9 fL    Immature Granulocytes 0.4 0.0 - 0.5 %    Gran # (ANC) 5.6 1.8 - 7.7 K/uL    Immature Grans (Abs) 0.03 0.00 - 0.04 K/uL    Lymph # 1.7 1.0  - 4.8 K/uL    Mono # 0.6 0.3 - 1.0 K/uL    Eos # 0.0 0.0 - 0.5 K/uL    Baso # 0.05 0.00 - 0.20 K/uL    nRBC 0 0 /100 WBC    Gran % 69.7 38.0 - 73.0 %    Lymph % 21.7 18.0 - 48.0 %    Mono % 7.3 4.0 - 15.0 %    Eosinophil % 0.3 0.0 - 8.0 %    Basophil % 0.6 0.0 - 1.9 %    Differential Method Automated    Comp. Metabolic Panel   Result Value Ref Range    Sodium 137 136 - 145 mmol/L    Potassium 3.6 3.5 - 5.1 mmol/L    Chloride 105 95 - 110 mmol/L    CO2 27 23 - 29 mmol/L    Glucose 94 70 - 110 mg/dL    BUN 15 6 - 20 mg/dL    Creatinine 0.8 0.5 - 1.4 mg/dL    Calcium 9.7 8.7 - 10.5 mg/dL    Total Protein 7.7 6.0 - 8.4 g/dL    Albumin 4.8 3.5 - 5.2 g/dL    Total Bilirubin 0.6 0.1 - 1.0 mg/dL    Alkaline Phosphatase 61 55 - 135 U/L    AST 13 10 - 40 U/L    ALT 8 (L) 10 - 44 U/L    eGFR >60.0 >60 mL/min/1.73 m^2    Anion Gap 5 (L) 8 - 16 mmol/L   Urinalysis, Reflex to Urine Culture Urine, Clean Catch    Specimen: Urine, Clean Catch   Result Value Ref Range    Specimen UA Urine, Clean Catch     Color, UA Light Red Yellow, Straw, She    Appearance, UA Cloudy (A) Clear    pH, UA 6.0 5.0 - 8.0    Specific Gravity, UA 1.025 1.005 - 1.030    Protein, UA Trace (A) Negative    Glucose, UA Negative Negative    Ketones, UA Negative Negative    Bilirubin (UA) Negative Negative    Occult Blood UA 3+ (A) Negative    Nitrite, UA Negative Negative    Urobilinogen, UA Negative Negative EU/dL    Leukocytes, UA Negative Negative   Urinalysis Microscopic   Result Value Ref Range    RBC, UA >100 (H) 0 - 4 /hpf    WBC, UA 1 0 - 5 /hpf    Bacteria Negative None-Occ /hpf    Squam Epithel, UA 2 /hpf    Hyaline Casts, UA 2 (A) 0-1/lpf /lpf    Microscopic Comment SEE COMMENT    POCT urine pregnancy   Result Value Ref Range    POC Preg Test, Ur Negative Negative     Acceptable Yes    Type & Screen   Result Value Ref Range    Group & Rh O POS     Indirect Zoë NEG     Specimen Outdate 11/18/2023 23:59        Imaging Results               US Transvaginal Non OB (Final result)  Result time 11/15/23 15:57:25      Final result by Joselo Lee MD (11/15/23 15:57:25)                   Narrative:    US PELVIS TRANSVAGINAL    CLINICAL HISTORY:  33 years Female vaginal bleeding Mirena removed 2 days ago, negative UPT    COMPARISON: November 30, 2022    FINDINGS: The uterus measures 8.5 x 5.0 x 6.5 cm. Endometrial thickness of 8 mm. No intrauterine fluid collection identified. No myometrial mass.    The right ovary measures 4.0 x 2.5 x 2.5 cm. Right ovarian structure with peripheral soft tissue and central fluid measuring 19 mm likely represents a corpus luteum. Appropriate Doppler flow to right ovarian parenchyma was documented. Trace free fluid in the right adnexal region. Left ovary measures 3.5 x 2.1 x 2.9 cm. Normal-appearing left ovarian follicles. Appropriate Doppler flow to the left ovary was documented.    Small-volume free fluid in the pelvis and left adnexa.    IMPRESSION:    Endometrial thickness of 8 mm.    Corpus luteum of the right ovary.    Small-volume free fluid in the pelvis and adnexa.    Electronically signed by:  Joselo Lee MD  11/15/2023 03:57 PM CST Workstation: [id number]                                     Medications   megestroL tablet 40 mg (has no administration in time range)   ferric subsulfate TOPICAL solution/paste (has no administration in time range)   sodium chloride 0.9% flush 10 mL (has no administration in time range)   megestroL tablet 40 mg (has no administration in time range)   megestroL tablet 40 mg (40 mg Oral Given 11/15/23 1700)     Medical Decision Making  Differential includes but not limited to vaginal bleeding, vaginal laceration, uterine perforation, PID, dysfunctional uterine bleeding,    Emergent evaluation of a 33-year-old female presents emergency department with the above-mentioned complaints.  Patient has evidence of brisk vaginal bleeding per pelvic exam performed by nurse  practitioner.  Patient bleeding etiology unclear at this point.  Bleeding has slowed down.  OBGYN Dr. Griffiths has examined the patient in the ER.  Patient will be admitted for observation.  Patient has been given Megace said that the direction of OBGYN and pelvic exam has been performed.  Patient will be admitted for observation.    Amount and/or Complexity of Data Reviewed  External Data Reviewed: labs and notes.  Labs: ordered. Decision-making details documented in ED Course.  Radiology: ordered. Decision-making details documented in ED Course.    Risk  Prescription drug management.               ED Course as of 11/15/23 1825   Wed Nov 15, 2023   1637 I discussed the case in detail with Dr. Griffiths who will come and evaluate the patient.  She request 40 mg of Megace to be given immediately.  I have called pharmacy and they state that they will send it down right away [JR]   8155 Dr. Griffiths came and evaluated the patient wants to admit the patient for observation.  She plans to do a D&C tomorrow [JR]      ED Course User Index  [JR] Terry Morgan DO                    Clinical Impression:   Final diagnoses:  [N93.9] Vaginal bleeding (Primary)        ED Disposition Condition    Observation Stable                Terry Morgan DO  11/15/23 1825

## 2023-11-15 NOTE — FIRST PROVIDER EVALUATION
Emergency Department TeleTriage Encounter Note      CHIEF COMPLAINT    Chief Complaint   Patient presents with    Vaginal Bleeding     Since this AM. Had mirena removed 2 days ago. Bleeding is heavy w/ clots per pt       VITAL SIGNS   Initial Vitals [11/15/23 1318]   BP Pulse Resp Temp SpO2   128/70 91 17 98.5 °F (36.9 °C) 98 %      MAP       --            ALLERGIES    Review of patient's allergies indicates:   Allergen Reactions    Penicillins Anaphylaxis       PROVIDER TRIAGE NOTE  This is a teletriage evaluation of a 33 y.o. female presenting to the ED with c/o vaginal bleeding since this morning.  Pt had Mirena removed 2 days ago. Pt states heavy bleeding started today.  Pt went to OB office and was advised by the nurse to come to the ED for evaluation.  Pt endorses weakness since that time.       PE: VSS.  NAD noted.      Plan: labs, imaging, monitor    Limited physical exam via telehealth: The patient is awake, alert, answering questions appropriately and is not in respiratory distress. All ED beds are full at present; patient notified of this status.  Patient seen and medically screened by Nurse Practitioner via teletriage.      Initial orders will be placed and care will be transferred to an alternate provider when patient is roomed for a full evaluation. Any additional orders and the final disposition will be determined by that provider.         ORDERS  Labs Reviewed   CBC W/ AUTO DIFFERENTIAL   COMPREHENSIVE METABOLIC PANEL   URINALYSIS, REFLEX TO URINE CULTURE   POCT URINE PREGNANCY       ED Orders (720h ago, onward)      Start Ordered     Status Ordering Provider    11/15/23 1332 11/15/23 1331  US Transvaginal Non OB  1 time imaging         Acknowledged ELMER TAMAYO    11/15/23 1332 11/15/23 1331  POCT urine pregnancy  Once         Acknowledged ELMER TAMAYO    11/15/23 1331 11/15/23 1331  CBC W/ AUTO DIFFERENTIAL  STAT         Acknowledged ELMER TAMAYO    11/15/23 1331 11/15/23 1331  Comp.  Metabolic Panel  STAT         Acknowledged ELMER TAMAYO    11/15/23 1331 11/15/23 1331  Urinalysis, Reflex to Urine Culture Urine, Clean Catch  STAT         Acknowledged ELMER TAMAYO              Virtual Visit Note: The provider triage portion of this emergency department evaluation and documentation was performed via [employer] a HIPAA-compliant telemedicine application, in concert with a tele-presenter in the room. A face to face patient evaluation with one of my colleagues will occur once the patient is placed in an emergency department room.      DISCLAIMER: This note was prepared with [employer] voice recognition transcription software. Garbled syntax, mangled pronouns, and other bizarre constructions may be attributed to that software system.

## 2023-11-16 VITALS
TEMPERATURE: 98 F | DIASTOLIC BLOOD PRESSURE: 77 MMHG | BODY MASS INDEX: 24.25 KG/M2 | HEART RATE: 104 BPM | HEIGHT: 68 IN | RESPIRATION RATE: 18 BRPM | WEIGHT: 160 LBS | OXYGEN SATURATION: 97 % | SYSTOLIC BLOOD PRESSURE: 117 MMHG

## 2023-11-16 LAB
ERYTHROCYTE [DISTWIDTH] IN BLOOD BY AUTOMATED COUNT: 12.6 % (ref 11.5–14.5)
HCT VFR BLD AUTO: 39.6 % (ref 37–48.5)
HGB BLD-MCNC: 13.6 G/DL (ref 12–16)
MCH RBC QN AUTO: 30.8 PG (ref 27–31)
MCHC RBC AUTO-ENTMCNC: 34.3 G/DL (ref 32–36)
MCV RBC AUTO: 90 FL (ref 82–98)
PLATELET # BLD AUTO: 222 K/UL (ref 150–450)
PMV BLD AUTO: 11 FL (ref 9.2–12.9)
RBC # BLD AUTO: 4.42 M/UL (ref 4–5.4)
WBC # BLD AUTO: 7.75 K/UL (ref 3.9–12.7)

## 2023-11-16 PROCEDURE — G0378 HOSPITAL OBSERVATION PER HR: HCPCS

## 2023-11-16 PROCEDURE — 85027 COMPLETE CBC AUTOMATED: CPT | Performed by: SPECIALIST

## 2023-11-16 PROCEDURE — 36415 COLL VENOUS BLD VENIPUNCTURE: CPT | Performed by: SPECIALIST

## 2023-11-16 NOTE — PHARMACY MED REC
"Admission Medication History     The home medication history was taken by Juan Driscoll.    You may go to "Admission" then "Reconcile Home Medications" tabs to review and/or act upon these items.     The home medication list has been updated by the Pharmacy department.   Please read ALL comments highlighted in yellow.   Please address this information as you see fit.    Feel free to contact us if you have any questions or require assistance.        Medications listed below were obtained from: Patient/family and Analytic software- [employer]  No current facility-administered medications on file prior to encounter.     Current Outpatient Medications on File Prior to Encounter   Medication Sig Dispense Refill    cephALEXin (KEFLEX) 500 MG capsule Take 500 mg by mouth 2 (two) times daily.      fluconazole (DIFLUCAN) 150 MG Tab Take 150 mg by mouth Every 3 (three) days.      gabapentin (NEURONTIN) 100 MG capsule Take 100 mg by mouth 3 (three) times daily as needed.      gabapentin (NEURONTIN) 300 MG capsule Take 300 mg by mouth nightly as needed.      sulfamethoxazole-trimethoprim 800-160mg (BACTRIM DS) 800-160 mg Tab Take 1 tablet by mouth 2 (two) times daily.      HYDROcodone-acetaminophen (NORCO) 5-325 mg per tablet Take 1 tablet by mouth every 4 (four) hours as needed. 12 tablet 0    HYDROcodone-acetaminophen (NORCO) 5-325 mg per tablet TAKE 1 TABLET BY MOUTH EVERY 4 HOURS AS NEEDED FOR PAIN 10 tablet 0    [DISCONTINUED] nitrofurantoin, macrocrystal-monohydrate, (MACROBID) 100 MG capsule Take 1 capsule (100 mg total) by mouth 2 (two) times daily. 14 capsule 0             Juan Driscoll  EXT 1924                .          "

## 2023-11-16 NOTE — DISCHARGE SUMMARY
[facility]  Obstetrics & Gynecology  Discharge Summary    Patient Name: Donis Angel  MRN: [id number]  Admission Date: 11/15/2023  Hospital Length of Stay: 0 days  Discharge Date and Time:  11/16/2023 7:39 AM  Attending Physician: Mireya Griffiths MD   Discharging Provider: Mireya Griffiths MD  Primary Care Provider: [facility]    HPI:  No notes on file    Hospital Course:  No notes on file    Goals of Care Treatment Preferences:  Code Status: Full Code      * No surgery found *     Consults (From admission, onward)          Status Ordering Provider     Inpatient consult to Obstetrics / Gynecology  Once        Provider:  Mireya Griffiths MD    Acknowledged GWEN FINN        Patient doing well this morning.  Vital signs are stable afebrile.  No changes in physical exam.  Minimal to no vaginal bleeding.    Assessment plan:   Spontaneous acute bright red heavy vaginal bleeding unexplained.  This seems to have resolved.  Patient did not take the recommended medications here in the hospital.  She refused megestrol.  Patient's vaginal bleeding has pretty much subsided.  I discussed the options with the patient she is still considering her birth control options.  She is more considering a tubal ligation.  Because all other medications have side effects.  She is to follow up with me in 2 weeks and we will reassess this Bartholin's gland cyst.  She is taking Bactrim and Keflex.  Advised no intercourse for a week.    Significant Diagnostic Studies: Labs: CBC   Recent Labs   Lab 11/15/23  1343 11/16/23  0259   WBC 7.97 7.75   HGB 13.8 13.6   HCT 40.1 39.6    222     Lab Results   Component Value Date    GROUPTRH O POS 11/15/2023         Pending Diagnostic Studies:       None          Final Active Diagnoses:    Diagnosis Date Noted POA    PRINCIPAL PROBLEM:  Vaginal bleeding [N93.9] 11/15/2023 Yes      Problems Resolved During this Admission:        Discharged  Condition: good    Disposition: Home or Self Care    Follow Up:2 weeks    Patient Instructions:      Diet Adult Regular     No driving until:   Order Comments: 14 days for vaginal delivery  28 days for  Section     No dressing needed     Leave dressing on - Keep it clean, dry, and intact until clinic visit     Activity as tolerated   Order Comments: Pelvic rest x 6 weeks     Medications:  Reconciled Home Medications:      Medication List        CONTINUE taking these medications      cephALEXin 500 MG capsule  Commonly known as: KEFLEX  Take 500 mg by mouth 2 (two) times daily.     fluconazole 150 MG Tab  Commonly known as: DIFLUCAN  Take 150 mg by mouth Every 3 (three) days.     sulfamethoxazole-trimethoprim 800-160mg 800-160 mg Tab  Commonly known as: BACTRIM DS  Take 1 tablet by mouth 2 (two) times daily.            ASK your doctor about these medications      * gabapentin 100 MG capsule  Commonly known as: NEURONTIN  Take 100 mg by mouth 3 (three) times daily as needed.     * gabapentin 300 MG capsule  Commonly known as: NEURONTIN  Take 300 mg by mouth nightly as needed.     * HYDROcodone-acetaminophen 5-325 mg per tablet  Commonly known as: NORCO  Take 1 tablet by mouth every 4 (four) hours as needed.     * HYDROcodone-acetaminophen 5-325 mg per tablet  Commonly known as: NORCO  TAKE 1 TABLET BY MOUTH EVERY 4 HOURS AS NEEDED FOR PAIN           * This list has 4 medication(s) that are the same as other medications prescribed for you. Read the directions carefully, and ask your doctor or other care provider to review them with you.                  Mireya Griffiths MD  Obstetrics & Gynecology  [facility]

## 2023-11-17 ENCOUNTER — PATIENT OUTREACH (OUTPATIENT)
Dept: ADMINISTRATIVE | Facility: CLINIC | Age: 33
End: 2023-11-17
Payer: MEDICAID

## 2023-11-17 NOTE — PROGRESS NOTES
2nd Attempt made to reach patient for TCC call. Left voicemail please call [phone] leave first name, last name, and  for Yifan I will return your call.

## 2023-11-17 NOTE — PROGRESS NOTES
C3 nurse attempted to contact Donis Angel  for a TCC post hospital discharge follow up call. No answer. The patient does not have a scheduled HOSFU appointment, and the pt does not have an [facility] PCP.

## 2023-11-18 LAB
BACTERIA GENITAL AEROBE CULT: NORMAL
CHLAMYDIA, AMPLIFIED DNA: NEGATIVE
GRAM STN SPEC: NORMAL
N GONORRHOEAE, AMPLIFIED DNA: NEGATIVE

## 2023-11-20 NOTE — PROGRESS NOTES
3rd Attempt made to reach patient for TCC call. Left voicemail please call [phone] leave first name, last name, and  for Yifan I will return your call.

## 2024-03-06 ENCOUNTER — OFFICE VISIT (OUTPATIENT)
Dept: URGENT CARE | Facility: CLINIC | Age: 34
End: 2024-03-06
Payer: MEDICAID

## 2024-03-06 VITALS
HEIGHT: 68 IN | HEART RATE: 87 BPM | TEMPERATURE: 98 F | RESPIRATION RATE: 16 BRPM | DIASTOLIC BLOOD PRESSURE: 67 MMHG | WEIGHT: 150 LBS | OXYGEN SATURATION: 98 % | SYSTOLIC BLOOD PRESSURE: 99 MMHG | BODY MASS INDEX: 22.73 KG/M2

## 2024-03-06 DIAGNOSIS — H91.91 DECREASED HEARING OF RIGHT EAR: ICD-10-CM

## 2024-03-06 DIAGNOSIS — H61.21 IMPACTED CERUMEN OF RIGHT EAR: Primary | ICD-10-CM

## 2024-03-06 PROCEDURE — 99214 OFFICE O/P EST MOD 30 MIN: CPT | Mod: S$GLB,,, | Performed by: PHYSICIAN ASSISTANT

## 2024-03-06 RX ORDER — CETIRIZINE HYDROCHLORIDE 10 MG/1
10 TABLET ORAL DAILY
Qty: 10 TABLET | Refills: 0 | Status: SHIPPED | OUTPATIENT
Start: 2024-03-06 | End: 2024-03-16

## 2024-03-06 NOTE — PROGRESS NOTES
"Subjective:      Patient ID: Donis Angel is a 33 y.o. female.    Vitals:  height is 5' 8" (1.727 m) and weight is 68 kg (150 lb). Her temperature is 98.2 °F (36.8 °C). Her blood pressure is 99/67 and her pulse is 87. Her respiration is 16 and oxygen saturation is 98%.     Chief Complaint: Cerumen Impaction    Donis Angel is a 33 y.o. female presenting for evaluation of ear wax build-up in her right ear, persisting for the last week.  She has been applying Debrox drops as previously recommended, but she has noticed no improvement.  She has an appointment with ENT, but not until the end of the month.  She has decreased hearing and pressure.  She'd like to have the ear flushed. She has no past medical history on file.        Constitution: Negative for chills and fever.   HENT:  Positive for ear pain (fullness) and hearing loss. Negative for congestion and sore throat.    Neck: Negative for neck pain.   Skin:  Negative for rash.   Neurological:  Negative for dizziness, light-headedness, headaches, numbness and tingling.   Psychiatric/Behavioral:  Negative for nervous/anxious. The patient is not nervous/anxious.       Objective:     Physical Exam   HENT:   Head: Normocephalic and atraumatic.   Ears:   Right Ear: External ear normal. impacted cerumen  Nose: Nose normal.   Mouth/Throat: Mucous membranes are moist.   Eyes: Conjunctivae are normal.   Neck: Neck supple.   Cardiovascular: Normal rate, regular rhythm, normal heart sounds and normal pulses.   Pulmonary/Chest: Effort normal and breath sounds normal.   Abdominal: Normal appearance.   Neurological: She is alert.   Nursing note and vitals reviewed.      Assessment:     1. Impacted cerumen of right ear    2. Decreased hearing of right ear        Plan:       Impacted cerumen of right ear  -     Ambulatory referral/consult to ENT    Decreased hearing of right ear  -     Ambulatory referral/consult to ENT  -     cetirizine (ZYRTEC) 10 MG tablet; Take 1 tablet " (10 mg total) by mouth once daily. for 10 days  Dispense: 10 tablet; Refill: 0          Medical Decision Making:   History:   Old Medical Records: I decided to obtain old medical records.  Old Records Summarized: records from clinic visits and records from previous admission(s).  Differential Diagnosis:   Cerumen impaction   Serous otitis media   Otitis externa       Urgent Care Management:  Small amount of cerumen noted on initial exam, which seemed to improve after ear irrigation and application of colace.  TM intact.  There may be a component of serous otitis media contributing to her symptoms.  No evidence for acute otitis media.  She will be discharged home to follow-up with ENT for re-evaluation.  In the meantime, she can take Zyrtec daily to help with possible serous otitis media.  Patient voices understanding and is agreeable to the plan.  Specific return precautions are given.

## 2024-03-06 NOTE — LETTER
March 6, 2024      [facility]  [address]  [facility] [address]  Phone: [phone]       Patient: Donis Angel   YOB: 1990  Date of Visit: 03/06/2024    To Whom It May Concern:    Marivel Angel  was at [facility] on 03/06/2024. The patient may return to work/school on 3/7/24 with no restrictions. If you have any questions or concerns, or if I can be of further assistance, please do not hesitate to contact me.    Sincerely,    Laurie Masterson PA-C

## 2024-06-06 ENCOUNTER — OCCUPATIONAL HEALTH (OUTPATIENT)
Dept: URGENT CARE | Facility: CLINIC | Age: 34
End: 2024-06-06

## 2024-06-06 DIAGNOSIS — Z23 IMMUNIZATION DUE: Primary | ICD-10-CM

## 2024-06-06 PROCEDURE — 86580 TB INTRADERMAL TEST: CPT | Mod: S$GLB,,, | Performed by: EMERGENCY MEDICINE

## 2024-06-28 ENCOUNTER — OCCUPATIONAL HEALTH (OUTPATIENT)
Dept: URGENT CARE | Facility: CLINIC | Age: 34
End: 2024-06-28

## 2024-06-28 DIAGNOSIS — Z00.00 ROUTINE GENERAL MEDICAL EXAMINATION AT A HEALTH CARE FACILITY: Primary | ICD-10-CM

## 2024-10-10 ENCOUNTER — HOSPITAL ENCOUNTER (EMERGENCY)
Facility: HOSPITAL | Age: 34
Discharge: LEFT BEFORE DISCHARGE | End: 2024-10-10
Attending: EMERGENCY MEDICINE
Payer: MEDICAID

## 2024-10-10 VITALS
RESPIRATION RATE: 18 BRPM | DIASTOLIC BLOOD PRESSURE: 69 MMHG | WEIGHT: 160 LBS | OXYGEN SATURATION: 100 % | BODY MASS INDEX: 24.25 KG/M2 | TEMPERATURE: 98 F | SYSTOLIC BLOOD PRESSURE: 106 MMHG | HEART RATE: 86 BPM | HEIGHT: 68 IN

## 2024-10-10 DIAGNOSIS — R10.9 ABDOMINAL PAIN, UNSPECIFIED ABDOMINAL LOCATION: Primary | ICD-10-CM

## 2024-10-10 LAB
ALBUMIN SERPL BCP-MCNC: 4 G/DL (ref 3.5–5.2)
ALP SERPL-CCNC: 61 U/L (ref 55–135)
ALT SERPL W/O P-5'-P-CCNC: 9 U/L (ref 10–44)
ANION GAP SERPL CALC-SCNC: 9 MMOL/L (ref 8–16)
AST SERPL-CCNC: 15 U/L (ref 10–40)
B-HCG UR QL: NEGATIVE
BACTERIA #/AREA URNS HPF: ABNORMAL /HPF
BACTERIA GENITAL QL WET PREP: ABNORMAL
BASOPHILS # BLD AUTO: 0.05 K/UL (ref 0–0.2)
BASOPHILS NFR BLD: 0.7 % (ref 0–1.9)
BILIRUB SERPL-MCNC: 0.5 MG/DL (ref 0.1–1)
BILIRUB UR QL STRIP: NEGATIVE
BUN SERPL-MCNC: 10 MG/DL (ref 6–20)
CALCIUM SERPL-MCNC: 9.7 MG/DL (ref 8.7–10.5)
CHLORIDE SERPL-SCNC: 106 MMOL/L (ref 95–110)
CLARITY UR: CLEAR
CLUE CELLS VAG QL WET PREP: ABNORMAL
CO2 SERPL-SCNC: 21 MMOL/L (ref 23–29)
COLOR UR: YELLOW
CREAT SERPL-MCNC: 0.8 MG/DL (ref 0.5–1.4)
CTP QC/QA: YES
DIFFERENTIAL METHOD BLD: ABNORMAL
EOSINOPHIL # BLD AUTO: 0 K/UL (ref 0–0.5)
EOSINOPHIL NFR BLD: 0.3 % (ref 0–8)
ERYTHROCYTE [DISTWIDTH] IN BLOOD BY AUTOMATED COUNT: 12.6 % (ref 11.5–14.5)
EST. GFR  (NO RACE VARIABLE): >60 ML/MIN/1.73 M^2
FILAMENT FUNGI VAG WET PREP-#/AREA: ABNORMAL
GLUCOSE SERPL-MCNC: 80 MG/DL (ref 70–110)
GLUCOSE UR QL STRIP: NEGATIVE
HCT VFR BLD AUTO: 36.4 % (ref 37–48.5)
HGB BLD-MCNC: 12.5 G/DL (ref 12–16)
HGB UR QL STRIP: ABNORMAL
IMM GRANULOCYTES # BLD AUTO: 0.03 K/UL (ref 0–0.04)
IMM GRANULOCYTES NFR BLD AUTO: 0.4 % (ref 0–0.5)
KETONES UR QL STRIP: NEGATIVE
LEUKOCYTE ESTERASE UR QL STRIP: ABNORMAL
LIPASE SERPL-CCNC: 26 U/L (ref 4–60)
LYMPHOCYTES # BLD AUTO: 1.9 K/UL (ref 1–4.8)
LYMPHOCYTES NFR BLD: 26.7 % (ref 18–48)
MCH RBC QN AUTO: 30.5 PG (ref 27–31)
MCHC RBC AUTO-ENTMCNC: 34.3 G/DL (ref 32–36)
MCV RBC AUTO: 89 FL (ref 82–98)
MICROSCOPIC COMMENT: ABNORMAL
MONOCYTES # BLD AUTO: 0.5 K/UL (ref 0.3–1)
MONOCYTES NFR BLD: 6.7 % (ref 4–15)
NEUTROPHILS # BLD AUTO: 4.7 K/UL (ref 1.8–7.7)
NEUTROPHILS NFR BLD: 65.2 % (ref 38–73)
NITRITE UR QL STRIP: NEGATIVE
NRBC BLD-RTO: 0 /100 WBC
PH UR STRIP: 6 [PH] (ref 5–8)
PLATELET # BLD AUTO: 193 K/UL (ref 150–450)
PMV BLD AUTO: 10.7 FL (ref 9.2–12.9)
POTASSIUM SERPL-SCNC: 3.9 MMOL/L (ref 3.5–5.1)
PROT SERPL-MCNC: 7.3 G/DL (ref 6–8.4)
PROT UR QL STRIP: NEGATIVE
RBC # BLD AUTO: 4.1 M/UL (ref 4–5.4)
RBC #/AREA URNS HPF: 5 /HPF (ref 0–4)
SODIUM SERPL-SCNC: 136 MMOL/L (ref 136–145)
SP GR UR STRIP: 1.02 (ref 1–1.03)
SPECIMEN SOURCE: ABNORMAL
SQUAMOUS #/AREA URNS HPF: 6 /HPF
T VAGINALIS GENITAL QL WET PREP: ABNORMAL
URN SPEC COLLECT METH UR: ABNORMAL
UROBILINOGEN UR STRIP-ACNC: NEGATIVE EU/DL
WBC # BLD AUTO: 7.19 K/UL (ref 3.9–12.7)
WBC #/AREA URNS HPF: 18 /HPF (ref 0–5)
WBC #/AREA VAG WET PREP: ABNORMAL
YEAST GENITAL QL WET PREP: ABNORMAL

## 2024-10-10 PROCEDURE — 25500020 PHARM REV CODE 255

## 2024-10-10 PROCEDURE — 99285 EMERGENCY DEPT VISIT HI MDM: CPT | Mod: 25

## 2024-10-10 PROCEDURE — 83690 ASSAY OF LIPASE: CPT | Performed by: NURSE PRACTITIONER

## 2024-10-10 PROCEDURE — 80053 COMPREHEN METABOLIC PANEL: CPT | Performed by: NURSE PRACTITIONER

## 2024-10-10 PROCEDURE — 87086 URINE CULTURE/COLONY COUNT: CPT | Performed by: NURSE PRACTITIONER

## 2024-10-10 PROCEDURE — 81000 URINALYSIS NONAUTO W/SCOPE: CPT | Performed by: NURSE PRACTITIONER

## 2024-10-10 PROCEDURE — 81025 URINE PREGNANCY TEST: CPT | Performed by: NURSE PRACTITIONER

## 2024-10-10 PROCEDURE — 87186 SC STD MICRODIL/AGAR DIL: CPT | Performed by: NURSE PRACTITIONER

## 2024-10-10 PROCEDURE — 85025 COMPLETE CBC W/AUTO DIFF WBC: CPT | Performed by: NURSE PRACTITIONER

## 2024-10-10 PROCEDURE — 36415 COLL VENOUS BLD VENIPUNCTURE: CPT | Performed by: NURSE PRACTITIONER

## 2024-10-10 PROCEDURE — 87491 CHLMYD TRACH DNA AMP PROBE: CPT | Performed by: EMERGENCY MEDICINE

## 2024-10-10 PROCEDURE — 87210 SMEAR WET MOUNT SALINE/INK: CPT | Performed by: EMERGENCY MEDICINE

## 2024-10-10 RX ADMIN — IOHEXOL 75 ML: 350 INJECTION, SOLUTION INTRAVENOUS at 02:10

## 2024-10-10 NOTE — ED NOTES
Pt left AMA. Pt had to get her kids and could not wait for the results. AMA form signed.  is aware. Pt will get called with results.

## 2024-10-10 NOTE — LETTER
Patient: Donis Angel  YOB: 1990  Date: 10/10/2024 Time: 2:49 PM  Location: [facility]    Leaving the Hospital Against Medical Advice    Chart #:[id number]    This will certify that I, the undersigned,    ______________________________________________________________________    A patient in the above named medical center, having requested discharge and removal from the medical center against the advice of my attending physician(s), hereby release [facility], its physicians, officers and employees, severally and individually, from any and all liability of any nature whatsoever for any injury or harm or complication of any kind that may result directly or indirectly, by reason of my terminating my stay as a patient at [facility] and my departure from [facility], and hereby waive any and all rights of action I may now have or later acquire as a result of my voluntary departure from [facility] and the termination of my stay as a patient therein.    This release is made with the full knowledge of the danger that may result from the action which I am taking.      Date:_______________________                         ___________________________                                                                                    Patient/Legal Representative    Witness:        ____________________________                          ___________________________  Nurse                                                                        Physician

## 2024-10-11 LAB
C TRACH RRNA SPEC QL NAA+PROBE: NEGATIVE
N GONORRHOEA RRNA SPEC QL NAA+PROBE: NEGATIVE
N.GONORROHEAE, AMP RNA SOURCE: NORMAL
SPECIMEN SOURCE: NORMAL

## 2024-10-11 NOTE — ED PROVIDER NOTES
Encounter Date: 10/10/2024       History     Chief Complaint   Patient presents with    Abdominal Pain     Lower abdominal pain; hx ovarian cysts and diverticulosis      Patient is a 34 y.o. female who presents to the ED 10/10/2024 with a chief complaint of Lower abdominal pain for 2 days that comes and goes.  Has any nausea, vomiting, diarrhea.  She states it feels kind of like a menstrual cramping.  He is not having any menstrual bleeding or vaginal bleeding.  She denies pregnancy.  She denies any vaginal discharge.  She denies any fever.  She is not sure what makes it better or worse.  She does have a history of diverticulosis and ovarian cyst.  She denies any urinary symptoms.         Review of patient's allergies indicates:   Allergen Reactions    Penicillins Anaphylaxis     History reviewed. No pertinent past medical history.  Past Surgical History:   Procedure Laterality Date    Caes       SECTION, CLASSIC      TONSILLECTOMY       No family history on file.  Social History     Tobacco Use    Smoking status: Never   Substance Use Topics    Alcohol use: No    Drug use: No     Review of Systems   Constitutional:  Negative for chills and fever.   Respiratory:  Negative for chest tightness and shortness of breath.    Cardiovascular:  Negative for chest pain.   Gastrointestinal:  Positive for abdominal pain. Negative for nausea and vomiting.   Genitourinary:  Negative for dysuria, vaginal bleeding, vaginal discharge and vaginal pain.   Musculoskeletal:  Negative for arthralgias and myalgias.   Skin:  Negative for rash and wound.   Neurological:  Negative for syncope.   Hematological:  Does not bruise/bleed easily.       Physical Exam     Initial Vitals [10/10/24 1016]   BP Pulse Resp Temp SpO2   121/68 86 18 98 °F (36.7 °C) 99 %      MAP       --         Physical Exam    Nursing note and vitals reviewed.  Constitutional: Vital signs are normal. She appears well-developed and well-nourished.   HENT:   Head:  Normocephalic and atraumatic.   Eyes: Pupils are equal, round, and reactive to light.   Neck: Neck supple.   Cardiovascular:  Normal rate, regular rhythm, normal heart sounds and intact distal pulses.     Exam reveals no gallop and no friction rub.       No murmur heard.  Pulmonary/Chest: Breath sounds normal. She has no wheezes. She has no rhonchi. She has no rales.   Abdominal: Abdomen is soft. Bowel sounds are normal. She exhibits no distension and no mass. There is abdominal tenderness in the right lower quadrant and left lower quadrant. Hernia confirmed negative in the right inguinal area and confirmed negative in the left inguinal area.   No right CVA tenderness.  No left CVA tenderness. There is no rebound and no guarding.   Genitourinary:    Vagina and uterus normal.   There is no rash, tenderness, lesion or injury on the right labia. There is no rash, tenderness, lesion or injury on the left labia. Cervix exhibits no motion tenderness, no discharge and no friability. Right adnexum displays no mass, no tenderness and no fullness. Left adnexum displays no mass, no tenderness and no fullness.    Genitourinary Comments: Anamika ramirez tech present during entire exam.     Musculoskeletal:      Cervical back: Neck supple.     Lymphadenopathy: No inguinal adenopathy noted on the right or left side.   Neurological: She is alert and oriented to person, place, and time. She has normal strength.   Skin: Skin is warm, dry and intact. Capillary refill takes less than 2 seconds.   Psychiatric: She has a normal mood and affect. Her speech is normal and behavior is normal.         ED Course   Procedures  Labs Reviewed   VAGINAL SCREEN - Abnormal       Result Value    Trichomonas None      Clue Cells None      Budding Yeast None      Fungal Hyphae None      WBC - Vaginal Screen Many (*)     Bacteria - Vaginal Screen Many (*)     Wet Prep Source Vagina      Narrative:     Release to patient->Immediate   CBC W/ AUTO  DIFFERENTIAL - Abnormal    WBC 7.19      RBC 4.10      Hemoglobin 12.5      Hematocrit 36.4 (*)     MCV 89      MCH 30.5      MCHC 34.3      RDW 12.6      Platelets 193      MPV 10.7      Immature Granulocytes 0.4      Gran # (ANC) 4.7      Immature Grans (Abs) 0.03      Lymph # 1.9      Mono # 0.5      Eos # 0.0      Baso # 0.05      nRBC 0      Gran % 65.2      Lymph % 26.7      Mono % 6.7      Eosinophil % 0.3      Basophil % 0.7      Differential Method Automated     COMPREHENSIVE METABOLIC PANEL - Abnormal    Sodium 136      Potassium 3.9      Chloride 106      CO2 21 (*)     Glucose 80      BUN 10      Creatinine 0.8      Calcium 9.7      Total Protein 7.3      Albumin 4.0      Total Bilirubin 0.5      Alkaline Phosphatase 61      AST 15      ALT 9 (*)     eGFR >60      Anion Gap 9     URINALYSIS, REFLEX TO URINE CULTURE - Abnormal    Specimen UA Urine, Clean Catch      Color, UA Yellow      Appearance, UA Clear      pH, UA 6.0      Specific Gravity, UA 1.025      Protein, UA Negative      Glucose, UA Negative      Ketones, UA Negative      Bilirubin (UA) Negative      Occult Blood UA 1+ (*)     Nitrite, UA Negative      Urobilinogen, UA Negative      Leukocytes, UA 3+ (*)     Narrative:     Specimen Source->Urine   URINALYSIS MICROSCOPIC - Abnormal    RBC, UA 5 (*)     WBC, UA 18 (*)     Bacteria Rare      Squam Epithel, UA 6      Microscopic Comment SEE COMMENT      Narrative:     Specimen Source->Urine   C.TRACH/N.GONOR AMP RNA, VARIES    C. trach. RNA Source Endocervix      N.gonorroheae, RNA Source Endocervix     CULTURE, URINE   LIPASE    Lipase 26     POCT URINE PREGNANCY    POC Preg Test, Ur Negative       Acceptable Yes            Imaging Results              CT Abdomen Pelvis With IV Contrast NO Oral Contrast (Final result)  Result time 10/10/24 15:20:37      Final result by Eze White Jr., MD (10/10/24 15:20:37)                   Impression:      2.4 cm cyst in the right  vaginal wall may represent an inclusion cyst or Bartholin's gland cyst, stable.  1.5 cm oblong hypodensity in the liver parenchyma may represent a scar or cyst, stable.      Electronically signed by: Eze White MD  Date:    10/10/2024  Time:    15:20               Narrative:    EXAMINATION:  CT ABDOMEN PELVIS WITH IV CONTRAST    CLINICAL HISTORY:  LLQ abdominal pain;RLQ abdominal pain (Age >= 14y);    TECHNIQUE:  Low dose axial images, sagittal and coronal reformations were obtained from the lung bases to the pubic symphysis following the IV administration of 75 mL of Omnipaque 350    COMPARISON:  CT abdomen pelvis of November 30, 2022.    FINDINGS:  The liver is of normal size and general CT density.  In segment 6 of the liver parenchyma is an oblong hypodensity measuring 1.5 x 0.5 cm which may represent a scar or cyst.  This is unchanged from the prior study however.  The gallbladder is of normal size without CT evidence of stone.  The pancreas is of normal contour and CT density without edema or mass.  The spleen is of normal size and CT density.    The adrenal glands are not enlarged.  The kidneys are of normal size contour and contrast enhancement without mass stone or hydronephrosis.  The abdominal aorta and inferior vena cava are of normal caliber.    The stomach is of normal configuration.  Small bowel dilatation or air-fluid levels are not seen.  The colon is of normal configuration without distention or mass.  Free fluid or free air is not seen.    The bladder is of normal contour without mass or asymmetry.  The uterus is within normal limits for the patient's age.  Small amount of free fluid is seen in the cul de sac.  There is a 2.4 cm cyst in the right vaginal wall which may represent an inclusion cyst or Bartholin's gland cyst.  This is unchanged from the prior study of November 2022                                       US Pelvis Comp with Transvag NON-OB (xpd) (Final result)  Result time  10/10/24 12:12:44   Procedure changed from US Pelvis Complete Non OB     Final result by Altagracia Grissom MD (10/10/24 12:12:44)                   Impression:      No sonographic abnormality.      Electronically signed by: Altagracia Grissom MD  Date:    10/10/2024  Time:    12:12               Narrative:    EXAMINATION:  US PELVIS COMP WITH TRANSVAG NON-OB (XPD)    CLINICAL HISTORY:  pain;    TECHNIQUE:  Transabdominal sonography of the pelvis was performed, followed by transvaginal sonography to better evaluate the uterus and ovaries.    COMPARISON:  None.    FINDINGS:  Uterus:    Size: 7.3 x 4.8 x 6.4 cm    Masses: None    Endometrium: Normal in this pre menopausal patient, measuring 9.5 mm.    Right ovary:    Size: 4.1 x 2.2 x 2.0 cm    Appearance: Normal.  There is what is well a representing degenerating corpus luteum seen within the left ovary.    Vascular flow: Normal.    Left ovary:    Size: 2.8 x 2.1 x 2.3 cm    Appearance: Normal    Vascular Flow: Normal.    Free Fluid:    There is a small amount of pelvic free fluid.                                       Medications   iohexoL (OMNIPAQUE 350) 350 mg iodine/mL injection (75 mLs Intravenous Given 10/10/24 4492)     Medical Decision Making  Amount and/or Complexity of Data Reviewed  Labs: ordered.  Radiology: ordered.         APC / Resident Notes:   Patient is a 34 y.o. female who presents to the ED 10/10/2024 who underwent emergent evaluation for Lower abdominal pain.  No guarding or distention.  Patient declines analgesia.  No acute distress.  Vital signs normal.  She has not appear septic or toxic.  She is not pregnant.  No leukocytosis.  No evidence of any acute infectious process requiring antibiotics.  Labs unremarkable.  No severe electrolyte derangements or STEPHANIE or dehydration noted.  Ultrasound without evidence of any emergent process such as ovarian torsion.   exam unremarkable.  No CMT or adnexal tenderness.  I do not think PID.  PCR for gonorrhea  and chlamydia are pending.  Urinalysis noted but patient having no urinary symptoms and I doubt UTI but will send for culture and notify patient if positive.  I do not think empiric treatment indicated at this time as patient is not having urinary symptoms.  I do not think TOA.  Recommended further evaluation with CT.  Patient agreeable to this plan of care but states that after the CT and prior to the read she has to go and  her kids from school. Advised patient that they need to stay in the hospital.  Patient refuses  to stay in the ED any longer at this time.   Alert and oriented to person place and situation.  I explained the risks of worsening condition, death etc in layman's terms to the patient.  Explained my concern for emergent intra-abdominal process such as colitis, perforation, abscess, appendicitis.  Discussed possible need for emergent surgical intervention pending these findings.  I did tell patient I would call her with the results but it my recommendation is for her to stay in the emergency department and I do not recommended leaving or discharge at this time.  Patient voices these risks back to me.   Patient is not altered and is not under the influence.  Patient is welcome to return to the hospital at any time if he chooses to do so.  I will Discharge the patient AGAINST MEDICAL ADVICE.                      Medical Decision Making:   Differential Diagnosis:   TOA  Ovarian cyst  Diverticulitis  Ovarian torsion               Clinical Impression:  Final diagnoses:  [R10.9] Abdominal pain, unspecified abdominal location (Primary)          ED Disposition Condition    AMA                 Liza Ureña, TRACY  10/10/24 1956       Liza Ureña NP  10/10/24 2037

## 2024-10-12 LAB — BACTERIA UR CULT: ABNORMAL

## 2024-11-27 DIAGNOSIS — N97.2 FEMALE INFERTILITY OF UTERINE ORIGIN: Primary | ICD-10-CM

## 2024-12-23 ENCOUNTER — HOSPITAL ENCOUNTER (OUTPATIENT)
Dept: RADIOLOGY | Facility: HOSPITAL | Age: 34
Discharge: HOME | End: 2024-12-23
Attending: SPECIALIST
Payer: MEDICAID

## 2024-12-23 DIAGNOSIS — N97.2 FEMALE INFERTILITY OF UTERINE ORIGIN: ICD-10-CM

## 2024-12-23 PROCEDURE — 74740 X-RAY FEMALE GENITAL TRACT: CPT | Mod: TC

## 2024-12-23 PROCEDURE — 25500020 PHARM REV CODE 255: Performed by: SPECIALIST

## 2024-12-23 RX ADMIN — IOHEXOL 20 ML: 300 INJECTION, SOLUTION INTRAVENOUS at 12:12

## 2025-03-28 ENCOUNTER — HOSPITAL ENCOUNTER (EMERGENCY)
Facility: HOSPITAL | Age: 35
Discharge: HOME | End: 2025-03-29
Attending: STUDENT IN AN ORGANIZED HEALTH CARE EDUCATION/TRAINING PROGRAM
Payer: MEDICAID

## 2025-03-28 DIAGNOSIS — R10.9 ABDOMINAL PAIN: ICD-10-CM

## 2025-03-28 DIAGNOSIS — R11.2 NAUSEA AND VOMITING, UNSPECIFIED VOMITING TYPE: Primary | ICD-10-CM

## 2025-03-28 DIAGNOSIS — U07.1 COVID-19 AFFECTING PREGNANCY IN SECOND TRIMESTER: ICD-10-CM

## 2025-03-28 DIAGNOSIS — R68.89 FLU-LIKE SYMPTOMS: ICD-10-CM

## 2025-03-28 DIAGNOSIS — R82.71 BACTERIURIA: ICD-10-CM

## 2025-03-28 DIAGNOSIS — O98.512 COVID-19 AFFECTING PREGNANCY IN SECOND TRIMESTER: ICD-10-CM

## 2025-03-28 PROCEDURE — 93010 ELECTROCARDIOGRAM REPORT: CPT | Mod: ,,, | Performed by: GENERAL PRACTICE

## 2025-03-28 PROCEDURE — 99285 EMERGENCY DEPT VISIT HI MDM: CPT | Mod: 25

## 2025-03-28 PROCEDURE — 81025 URINE PREGNANCY TEST: CPT | Performed by: NURSE PRACTITIONER

## 2025-03-28 PROCEDURE — 93005 ELECTROCARDIOGRAM TRACING: CPT | Performed by: GENERAL PRACTICE

## 2025-03-28 NOTE — Clinical Note
"Donis Xiong" Stanley was seen and treated in our emergency department on 3/28/2025.  She may return to work on 03/31/2025.       If you have any questions or concerns, please don't hesitate to call.      Mckay Colin MD"

## 2025-03-29 VITALS
WEIGHT: 160 LBS | SYSTOLIC BLOOD PRESSURE: 105 MMHG | OXYGEN SATURATION: 100 % | HEIGHT: 68 IN | BODY MASS INDEX: 24.25 KG/M2 | DIASTOLIC BLOOD PRESSURE: 69 MMHG | RESPIRATION RATE: 17 BRPM | HEART RATE: 89 BPM | TEMPERATURE: 99 F

## 2025-03-29 LAB
ABSOLUTE EOSINOPHIL (SMH): 0.12 K/UL
ABSOLUTE MONOCYTE (SMH): 0.82 K/UL (ref 0.3–1)
ABSOLUTE NEUTROPHIL COUNT (SMH): 3.5 K/UL (ref 1.8–7.7)
ALBUMIN SERPL-MCNC: 4 G/DL (ref 3.5–5.2)
ALP SERPL-CCNC: 62 UNIT/L (ref 55–135)
ALT SERPL-CCNC: 20 UNIT/L (ref 10–44)
ANION GAP (SMH): 12 MMOL/L (ref 8–16)
AST SERPL-CCNC: 22 UNIT/L (ref 10–40)
B-HCG FREE SERPL-ACNC: NORMAL MIU/ML
B-HCG UR QL: POSITIVE
BACTERIA #/AREA URNS AUTO: ABNORMAL /HPF
BASOPHILS # BLD AUTO: 0.02 K/UL
BASOPHILS NFR BLD AUTO: 0.4 %
BILIRUB SERPL-MCNC: 0.4 MG/DL (ref 0.1–1)
BILIRUB UR QL STRIP.AUTO: NEGATIVE
BUN SERPL-MCNC: 10 MG/DL (ref 6–20)
CALCIUM SERPL-MCNC: 9.1 MG/DL (ref 8.7–10.5)
CHLORIDE SERPL-SCNC: 103 MMOL/L (ref 95–110)
CLARITY UR: ABNORMAL
CO2 SERPL-SCNC: 19 MMOL/L (ref 23–29)
COLOR UR AUTO: YELLOW
CREAT SERPL-MCNC: 0.6 MG/DL (ref 0.5–1.4)
CTP QC/QA: YES
ERYTHROCYTE [DISTWIDTH] IN BLOOD BY AUTOMATED COUNT: 13 % (ref 11.5–14.5)
GFR SERPLBLD CREATININE-BSD FMLA CKD-EPI: >60 ML/MIN/1.73/M2
GLUCOSE SERPL-MCNC: 133 MG/DL (ref 70–110)
GLUCOSE UR QL STRIP: NEGATIVE
HCT VFR BLD AUTO: 34.9 % (ref 37–48.5)
HGB BLD-MCNC: 12.3 GM/DL (ref 12–16)
HGB UR QL STRIP: NEGATIVE
IMM GRANULOCYTES # BLD AUTO: 0.09 K/UL (ref 0–0.04)
IMM GRANULOCYTES NFR BLD AUTO: 1.6 % (ref 0–0.5)
INFLUENZA A MOLECULAR (OHS): NEGATIVE
INFLUENZA B MOLECULAR (OHS): NEGATIVE
KETONES UR QL STRIP: ABNORMAL
LEUKOCYTE ESTERASE UR QL STRIP: ABNORMAL
LIPASE SERPL-CCNC: 25 U/L (ref 4–60)
LYMPHOCYTES # BLD AUTO: 1.12 K/UL (ref 1–4.8)
MCH RBC QN AUTO: 30.9 PG (ref 27–31)
MCHC RBC AUTO-ENTMCNC: 35.2 G/DL (ref 32–36)
MCV RBC AUTO: 88 FL (ref 82–98)
MICROSCOPIC COMMENT: ABNORMAL
NITRITE UR QL STRIP: NEGATIVE
NUCLEATED RBC (/100WBC) (SMH): 0 /100 WBC
OHS QRS DURATION: 88 MS
OHS QTC CALCULATION: 425 MS
PH UR STRIP: 6 [PH]
PLATELET # BLD AUTO: 185 K/UL (ref 150–450)
PMV BLD AUTO: 10.8 FL (ref 9.2–12.9)
POTASSIUM SERPL-SCNC: 3.3 MMOL/L (ref 3.5–5.1)
PROT SERPL-MCNC: 7.2 GM/DL (ref 6–8.4)
PROT UR QL STRIP: ABNORMAL
RBC # BLD AUTO: 3.98 M/UL (ref 4–5.4)
RBC #/AREA URNS AUTO: 9 /HPF
RELATIVE EOSINOPHIL (SMH): 2.1 % (ref 0–8)
RELATIVE LYMPHOCYTE (SMH): 19.6 % (ref 18–48)
RELATIVE MONOCYTE (SMH): 14.4 % (ref 4–15)
RELATIVE NEUTROPHIL (SMH): 61.9 % (ref 38–73)
SARS-COV-2 RDRP RESP QL NAA+PROBE: POSITIVE
SODIUM SERPL-SCNC: 134 MMOL/L (ref 136–145)
SP GR UR STRIP: >=1.03
SQUAMOUS #/AREA URNS AUTO: 16 /HPF
UROBILINOGEN UR STRIP-ACNC: NEGATIVE EU/DL
WBC # BLD AUTO: 5.7 K/UL (ref 3.9–12.7)
WBC #/AREA URNS AUTO: 17 /HPF

## 2025-03-29 PROCEDURE — 84702 CHORIONIC GONADOTROPIN TEST: CPT | Performed by: STUDENT IN AN ORGANIZED HEALTH CARE EDUCATION/TRAINING PROGRAM

## 2025-03-29 PROCEDURE — U0002 COVID-19 LAB TEST NON-CDC: HCPCS | Performed by: STUDENT IN AN ORGANIZED HEALTH CARE EDUCATION/TRAINING PROGRAM

## 2025-03-29 PROCEDURE — 96360 HYDRATION IV INFUSION INIT: CPT

## 2025-03-29 PROCEDURE — 63600175 PHARM REV CODE 636 W HCPCS: Performed by: STUDENT IN AN ORGANIZED HEALTH CARE EDUCATION/TRAINING PROGRAM

## 2025-03-29 PROCEDURE — 83690 ASSAY OF LIPASE: CPT | Performed by: STUDENT IN AN ORGANIZED HEALTH CARE EDUCATION/TRAINING PROGRAM

## 2025-03-29 PROCEDURE — 81003 URINALYSIS AUTO W/O SCOPE: CPT | Performed by: NURSE PRACTITIONER

## 2025-03-29 PROCEDURE — 80053 COMPREHEN METABOLIC PANEL: CPT | Performed by: STUDENT IN AN ORGANIZED HEALTH CARE EDUCATION/TRAINING PROGRAM

## 2025-03-29 PROCEDURE — 96361 HYDRATE IV INFUSION ADD-ON: CPT

## 2025-03-29 PROCEDURE — 85025 COMPLETE CBC W/AUTO DIFF WBC: CPT | Performed by: STUDENT IN AN ORGANIZED HEALTH CARE EDUCATION/TRAINING PROGRAM

## 2025-03-29 PROCEDURE — 87086 URINE CULTURE/COLONY COUNT: CPT | Performed by: NURSE PRACTITIONER

## 2025-03-29 PROCEDURE — 87502 INFLUENZA DNA AMP PROBE: CPT | Performed by: STUDENT IN AN ORGANIZED HEALTH CARE EDUCATION/TRAINING PROGRAM

## 2025-03-29 RX ORDER — CEPHALEXIN 500 MG/1
500 CAPSULE ORAL EVERY 8 HOURS
Qty: 15 CAPSULE | Refills: 0 | Status: SHIPPED | OUTPATIENT
Start: 2025-03-29 | End: 2025-04-03

## 2025-03-29 RX ORDER — ONDANSETRON HYDROCHLORIDE 2 MG/ML
4 INJECTION, SOLUTION INTRAVENOUS
Status: DISCONTINUED | OUTPATIENT
Start: 2025-03-29 | End: 2025-03-29 | Stop reason: HOSPADM

## 2025-03-29 RX ORDER — NITROFURANTOIN 25; 75 MG/1; MG/1
100 CAPSULE ORAL 2 TIMES DAILY
Qty: 10 CAPSULE | Refills: 0 | Status: SHIPPED | OUTPATIENT
Start: 2025-03-29 | End: 2025-04-03

## 2025-03-29 RX ORDER — FAMOTIDINE 10 MG/ML
20 INJECTION, SOLUTION INTRAVENOUS
Status: DISCONTINUED | OUTPATIENT
Start: 2025-03-29 | End: 2025-03-29 | Stop reason: HOSPADM

## 2025-03-29 RX ORDER — DOXYLAMINE SUCCINATE AND PYRIDOXINE HYDROCHLORIDE, DELAYED RELEASE TABLETS 10 MG/10 MG 10; 10 MG/1; MG/1
2 TABLET, DELAYED RELEASE ORAL NIGHTLY
Qty: 10 TABLET | Refills: 0 | Status: SHIPPED | OUTPATIENT
Start: 2025-03-29

## 2025-03-29 RX ADMIN — SODIUM CHLORIDE, SODIUM LACTATE, POTASSIUM CHLORIDE, AND CALCIUM CHLORIDE 1000 ML: .6; .31; .03; .02 INJECTION, SOLUTION INTRAVENOUS at 04:03

## 2025-03-29 NOTE — ED PROVIDER NOTES
Encounter Date: 3/28/2025       History     Chief Complaint   Patient presents with    Emesis During Pregnancy     Patient c/o vomiting x 2 days    Generalized Body Aches    Abdominal Pain     HPI  34-year-old woman who is a proximally 12 weeks pregnant presents for evaluation of nausea or vomiting nonbilious nonbloody for 2 days, generalized body aches, upset stomach.  She denies fever or chills.  She reports nasal congestion.  She denies chest pain.  She denies change in urination, she denies change in bowel habits she denies vaginal bleeding or vaginal discharge  Review of patient's allergies indicates:   Allergen Reactions    Penicillins Anaphylaxis     No past medical history on file.  Past Surgical History:   Procedure Laterality Date    Caes       SECTION, CLASSIC      TONSILLECTOMY       No family history on file.  Social History[1]  Review of Systems   All other systems reviewed and are negative.      Physical Exam     Initial Vitals [25 2332]   BP Pulse Resp Temp SpO2   98/65 (!) 111 20 98.9 °F (37.2 °C) 99 %      MAP       --         Physical Exam    Nursing note and vitals reviewed.  Constitutional: She appears well-developed. She is not diaphoretic.   HENT:   Head: Normocephalic and atraumatic.   Eyes: Right eye exhibits no discharge. Left eye exhibits no discharge.   Neck: No tracheal deviation present.   Cardiovascular:  Normal rate, regular rhythm and intact distal pulses.           Pulmonary/Chest: Breath sounds normal. No stridor. No respiratory distress.   Abdominal: Abdomen is soft. There is no abdominal tenderness. There is no rebound and no guarding.   Musculoskeletal:         General: No tenderness.     Neurological: She is alert and oriented to person, place, and time.   Skin: Skin is warm and dry.         ED Course   Procedures  Labs Reviewed   POCT URINE PREGNANCY - Abnormal       Result Value    POC Preg Test, Ur Positive (*)      Acceptable Yes     INFLUENZA A  & B BY MOLECULAR   URINALYSIS, REFLEX TO URINE CULTURE   CBC W/ AUTO DIFFERENTIAL    Narrative:     The following orders were created for panel order CBC W/ AUTO DIFFERENTIAL.  Procedure                               Abnormality         Status                     ---------                               -----------         ------                     CBC with Differential[[id number]]                                                        Please view results for these tests on the individual orders.   COMPREHENSIVE METABOLIC PANEL   LIPASE   HCG, QUANTITATIVE   CBC WITH DIFFERENTIAL   SARS-COV-2 RNA AMPLIFICATION, QUAL          Imaging Results              US OB <14 Wks, TransAbd, Single Gestation (In process)                      Medications   lactated ringers bolus 1,000 mL (has no administration in time range)   ondansetron injection 4 mg (has no administration in time range)   famotidine (PF) injection 20 mg (has no administration in time range)     Medical Decision Making  Flu-like illness she is 12 weeks pregnant heart rate 110 in triage blood pressure 100/60, on exam she is well-appearing with a soft nontender abdomen.  She is tolerating p.o. she ate some crackers.  Differential includes metabolic or endocrine derangement hyperemesis, I will get an ultrasound of her abdomen, I will get labs, I will give her some fluid, we will treat her symptoms    Amount and/or Complexity of Data Reviewed  Independent Historian: spouse     Details: Reports she has been pregnant 6 times and has 7 kids  Labs: ordered. Decision-making details documented in ED Course.  Radiology: ordered and independent interpretation performed. Decision-making details documented in ED Course.    Risk  Prescription drug management.               ED Course as of 03/29/25 2100   Sat Mar 29, 2025   0634 SARS COV-2 MOLECULAR(!): Positive [CH]   0659 Signed out pending labs, she has tolerated PO, refused zofran [IC]   0701 WBC, UA(!): 17 [CH]   0701  Leukocyte Esterase, UA(!): 2+  In the setting of pregnancy although asymptomatic with leukocytes positive we will discharge with antibiotics for presumed asymptomatic bacteriuria. [CH]   0058 Patient feels much better following the fluids.  Patient able to tolerate p.o., ready for discharge.  We will send prescription for antiemetic to pharmacy the patient will contact her OBGYN and primary care provider for continued monitoring and evaluation. [CH]      ED Course User Index  [CH] Mckay Colin MD  [IC] Jonathon Stephens MD                           Clinical Impression:  Final diagnoses:  [R11.2] Nausea and vomiting, unspecified vomiting type (Primary)  [R68.89] Flu-like symptoms                   [1]   Social History  Tobacco Use    Smoking status: Never   Substance Use Topics    Alcohol use: No    Drug use: No        Jonathon Stephens MD  03/29/25 2100

## 2025-03-29 NOTE — ED NOTES
Pt A&Ox4, ambulatory. Pt denies any nausea or emesis within several hours. Pt previously declined ordered zofran and pepcid. Pt in bed with significant other, no visible signs of distress. This nurse sent ordered blood that was pending collected. Communicated care plans with pt.

## 2025-03-29 NOTE — DISCHARGE INSTRUCTIONS
You were seen in the emergency department for evaluation of weakness, fatigue, nausea, vomiting, found to have COVID-19.  You were able to tolerate water while in the ED.  We discussed use of Paxlovid to include risks and benefits and at this time your electing to not take the medication.  You have what could be asymptomatic bacteriuria based on your urinalysis therefore we will send you home with prescription for Macrobid.  We will also send you home Diclegis a medication to help with nausea and vomiting during pregnancy.  We strongly recommend you follow up with the primary care provider for continued monitoring and evaluation.  He is return to the emergency department if you have worsening symptoms to include nausea, vomiting, fever, abdominal pain, or any other concerns you believe be evaluated by emergency room provider.    DO NOT TAKE THE KEFLEX you have an allergy documented to penicillin and there is some cross reactivity with that medication.  You need to be evaluated by allergy and immunology as an outpatient when you are able to be tested.

## 2025-03-29 NOTE — ED PROVIDER NOTES
[place] of Care Note    I assumed care of this patient upon shift change/transfer to [facility]. Patient was evaluated and work up initiated by the previous shift staff physician at other [facility] facility.    Brief HPI and work up thus far:    Patient is a 34 y.o. female who presented with chief complaint of   Chief Complaint   Patient presents with    Emesis During Pregnancy     Patient c/o vomiting x 2 days    Generalized Body Aches    Abdominal Pain     Patient presented to the emergency department for evaluation of nausea and vomiting this described as nonbilious and nonbloody for the past 2 days, general body aches, weakness fatigue, congestion my denies fever, night sweats, chills, chest pain, shortness for breath, , dysuria, or focal neurologic deficit this time.    Exam:  Vitals:    03/29/25 0907   BP: 105/69   Pulse: 89   Resp: 17   Temp: 98.5 °F (36.9 °C)       Gen: alert, cooperative  HEENT: EOMI, MMM, supple neck  CV: RRR, no m/g/r  Abd: soft, NT/ND, +BS  Extr: WWP, no CCE  Vasc: 2+ DP/RP, no peripheral edema  Skin: w/d/I    MDM    Upon my assessment patient reports she is feeling much better following fluids.  At this time only at 250 mL in.  Patients partners in the room whether.  Discussed labs ultrasound.  Advised the patient was COVID-19 positive. At this time is unlikely the patient is experiencing hyperemesis gravidarum, biliary disease, ectopic pregnancy, gastroenteritis, pancreatitis, appendicitis, hepatitis, peptic ulcer disease, pyelonephritis,.  Urinalysis on the previous shift did have some ketones patient reports she is able to drink okay.  Urinalysis also has 2+ leukocyte esterase in the setting of pregnancy administered Macrobid.  Patient was noted to have COVID-19 discussed at length pros and cons of treating with Paxlovid indicates that at this time she would not like therapy and would like to discuss further with her OBGYN whom she will be able to call and establish follow up early  this upcoming week.  All questions were answered patient able to tolerate p.o. while in the emergency department patient awaiting ambulate without assistance patient was given strict return precautions and again advised of the importance of following up with the primary care provider to which she voiced understanding and agreed.  The patient was then discharged.    Mckay Colin MD   03/29/2025 6:55 AM       Mckay Colin MD  03/29/25 7311

## 2025-03-31 LAB — BACTERIA UR CULT: NORMAL
